# Patient Record
Sex: FEMALE | Race: BLACK OR AFRICAN AMERICAN | NOT HISPANIC OR LATINO | Employment: FULL TIME | URBAN - METROPOLITAN AREA
[De-identification: names, ages, dates, MRNs, and addresses within clinical notes are randomized per-mention and may not be internally consistent; named-entity substitution may affect disease eponyms.]

---

## 2023-05-16 ENCOUNTER — DOCUMENTATION (OUTPATIENT)
Dept: PRIMARY CARE | Facility: CLINIC | Age: 48
End: 2023-05-16

## 2023-05-16 ENCOUNTER — TELEPHONE (OUTPATIENT)
Dept: PRIMARY CARE | Facility: CLINIC | Age: 48
End: 2023-05-16

## 2023-05-16 NOTE — TELEPHONE ENCOUNTER
----- Message from Emi Lugo PA-C sent at 5/15/2023  9:26 PM EDT -----  Diagnostic left mammogram was benign.  Continue with annual screening

## 2024-11-22 ENCOUNTER — APPOINTMENT (OUTPATIENT)
Dept: OBSTETRICS AND GYNECOLOGY | Facility: CLINIC | Age: 49
End: 2024-11-22
Payer: COMMERCIAL

## 2025-01-28 ENCOUNTER — OFFICE VISIT (OUTPATIENT)
Dept: PRIMARY CARE | Facility: CLINIC | Age: 50
End: 2025-01-28
Payer: COMMERCIAL

## 2025-01-28 VITALS
HEART RATE: 101 BPM | BODY MASS INDEX: 23.39 KG/M2 | OXYGEN SATURATION: 96 % | DIASTOLIC BLOOD PRESSURE: 90 MMHG | SYSTOLIC BLOOD PRESSURE: 170 MMHG | HEIGHT: 64 IN | WEIGHT: 137 LBS | TEMPERATURE: 97.4 F

## 2025-01-28 DIAGNOSIS — E78.2 MIXED HYPERLIPIDEMIA: ICD-10-CM

## 2025-01-28 DIAGNOSIS — E11.42 DIABETIC PERIPHERAL NEUROPATHY (MULTI): ICD-10-CM

## 2025-01-28 DIAGNOSIS — E11.65 UNCONTROLLED TYPE 2 DIABETES MELLITUS WITH HYPERGLYCEMIA: ICD-10-CM

## 2025-01-28 DIAGNOSIS — E03.9 HYPOTHYROIDISM, UNSPECIFIED TYPE: ICD-10-CM

## 2025-01-28 DIAGNOSIS — I10 HYPERTENSION, ESSENTIAL: ICD-10-CM

## 2025-01-28 DIAGNOSIS — Z12.11 COLON CANCER SCREENING: ICD-10-CM

## 2025-01-28 DIAGNOSIS — Z00.00 ADULT GENERAL MEDICAL EXAM: Primary | ICD-10-CM

## 2025-01-28 DIAGNOSIS — Z12.31 BREAST CANCER SCREENING BY MAMMOGRAM: ICD-10-CM

## 2025-01-28 PROBLEM — E11.649 UNCONTROLLED TYPE 2 DIABETES MELLITUS WITH HYPOGLYCEMIA WITHOUT COMA: Status: ACTIVE | Noted: 2025-01-28

## 2025-01-28 LAB
POC FINGERSTICK BLOOD GLUCOSE: 534 MG/DL (ref 70–100)
POC HEMOGLOBIN A1C: 15 % (ref 4.2–6.5)

## 2025-01-28 PROCEDURE — 4010F ACE/ARB THERAPY RXD/TAKEN: CPT | Performed by: INTERNAL MEDICINE

## 2025-01-28 PROCEDURE — 83036 HEMOGLOBIN GLYCOSYLATED A1C: CPT | Performed by: INTERNAL MEDICINE

## 2025-01-28 PROCEDURE — 99396 PREV VISIT EST AGE 40-64: CPT | Performed by: INTERNAL MEDICINE

## 2025-01-28 PROCEDURE — 3080F DIAST BP >= 90 MM HG: CPT | Performed by: INTERNAL MEDICINE

## 2025-01-28 PROCEDURE — 1036F TOBACCO NON-USER: CPT | Performed by: INTERNAL MEDICINE

## 2025-01-28 PROCEDURE — 99396 PREV VISIT EST AGE 40-64: CPT | Mod: 25 | Performed by: INTERNAL MEDICINE

## 2025-01-28 PROCEDURE — 90750 HZV VACC RECOMBINANT IM: CPT | Performed by: INTERNAL MEDICINE

## 2025-01-28 PROCEDURE — 90715 TDAP VACCINE 7 YRS/> IM: CPT | Performed by: INTERNAL MEDICINE

## 2025-01-28 PROCEDURE — 3008F BODY MASS INDEX DOCD: CPT | Performed by: INTERNAL MEDICINE

## 2025-01-28 PROCEDURE — 3077F SYST BP >= 140 MM HG: CPT | Performed by: INTERNAL MEDICINE

## 2025-01-28 PROCEDURE — 82962 GLUCOSE BLOOD TEST: CPT | Performed by: INTERNAL MEDICINE

## 2025-01-28 RX ORDER — INSULIN GLARGINE 100 [IU]/ML
20 INJECTION, SOLUTION SUBCUTANEOUS EVERY 24 HOURS
Qty: 15 ML | Refills: 3 | Status: SHIPPED | OUTPATIENT
Start: 2025-01-28 | End: 2025-11-24

## 2025-01-28 RX ORDER — DULAGLUTIDE 0.75 MG/.5ML
0.75 INJECTION, SOLUTION SUBCUTANEOUS WEEKLY
Qty: 2 ML | Refills: 1 | Status: SHIPPED | OUTPATIENT
Start: 2025-01-28

## 2025-01-28 RX ORDER — INSULIN LISPRO 100 [IU]/ML
INJECTION, SOLUTION INTRAVENOUS; SUBCUTANEOUS
Qty: 15 ML | Refills: 3 | Status: SHIPPED | OUTPATIENT
Start: 2025-01-28

## 2025-01-28 RX ORDER — LANCETS 26 GAUGE
EACH MISCELLANEOUS
Qty: 1 EACH | Refills: 0 | Status: SHIPPED | OUTPATIENT
Start: 2025-01-28 | End: 2026-01-28

## 2025-01-28 RX ORDER — BLOOD-GLUCOSE METER
1 EACH MISCELLANEOUS 4 TIMES DAILY
Qty: 400 STRIP | Refills: 3 | Status: SHIPPED | OUTPATIENT
Start: 2025-01-28

## 2025-01-28 RX ORDER — GABAPENTIN 300 MG/1
300 CAPSULE ORAL NIGHTLY
Qty: 30 CAPSULE | Refills: 3 | Status: SHIPPED | OUTPATIENT
Start: 2025-01-28 | End: 2025-07-27

## 2025-01-28 RX ORDER — LISINOPRIL 20 MG/1
20 TABLET ORAL DAILY
Qty: 30 TABLET | Refills: 3 | Status: SHIPPED | OUTPATIENT
Start: 2025-01-28

## 2025-01-28 RX ORDER — ISOPROPYL ALCOHOL 70 ML/100ML
1 SWAB TOPICAL 4 TIMES DAILY
Qty: 400 EACH | Refills: 3 | Status: SHIPPED | OUTPATIENT
Start: 2025-01-28

## 2025-01-28 RX ORDER — DEXTROSE 4 G
TABLET,CHEWABLE ORAL
Qty: 1 EACH | Refills: 0 | Status: SHIPPED | OUTPATIENT
Start: 2025-01-28

## 2025-01-28 ASSESSMENT — PAIN SCALES - GENERAL: PAINLEVEL_OUTOF10: 0-NO PAIN

## 2025-01-28 NOTE — PROGRESS NOTES
Subjective   Patient ID: Milagros Forde is a 49 y.o. female who presents for Annual Exam.    HPI  1.  Physical exam.  Pt is presenting to establish with a new PCP. She has not been seen in years. She has DM and has not taken medications in years.  She is c/o dizziness, increased thirst and numbness/tingling in BLE.  She has DM retinopathy.  Pap: last done 1/2021  Mammogram: last done 5/2023  Colonoscopy: due     Review of Systems  All pertinent positive symptoms are included in the history of present illness.    All other systems have been reviewed and are negative and noncontributory to this patient's current ailments.    Past Medical History:   Diagnosis Date    Encounter for gynecological examination (general) (routine) without abnormal findings 09/17/2015    Well woman exam with routine gynecological exam    Encounter for other screening for malignant neoplasm of breast 09/17/2015    Breast cancer screening    Encounter for screening for infections with a predominantly sexual mode of transmission 09/17/2015    Screening for STD (sexually transmitted disease)    Encounter for screening for malignant neoplasm of cervix 09/17/2015    Cervical cancer screening    Hypothyroidism due to medicaments and other exogenous substances 10/07/2015    Hypothyroidism, iatrogenic    Other abnormal and inconclusive findings on diagnostic imaging of breast 09/25/2015    Abnormal mammogram of right breast    Other conditions influencing health status 09/29/2015    Type 2 diabetes mellitus, uncontrolled    Other specified disorders of teeth and supporting structures 11/09/2016    Pain, dental    Personal history of other endocrine, nutritional and metabolic disease 10/07/2015    History of type 2 diabetes mellitus    Personal history of other endocrine, nutritional and metabolic disease 09/29/2015    History of diabetic neuropathy    Personal history of other endocrine, nutritional and metabolic disease 10/07/2015    History of  "hypercholesterolemia    Vitamin D deficiency, unspecified 10/07/2015    Vitamin D deficiency     Past Surgical History:   Procedure Laterality Date    TUBAL LIGATION  09/29/2015    Tubal Ligation     Social History     Tobacco Use    Smoking status: Never    Smokeless tobacco: Never     Family History   Problem Relation Name Age of Onset    Hypertension Mother      Diabetes Mother      Other (Blindness) Mother          left eye     No Known Allergies  Immunization History   Administered Date(s) Administered    Tdap vaccine, age 7 year and older (BOOSTRIX, ADACEL) 01/28/2025    Zoster vaccine, recombinant, adult (SHINGRIX) 01/28/2025     Current Outpatient Medications   Medication Instructions    alcohol swabs (Alcohol Pads) pads, medicated 1 Pad, topical (top), 4 times daily    Autolet lancing device Use as instructed    blood sugar diagnostic (OneTouch Verio test strips) strip 1 strip, miscellaneous, 4 times daily    blood-glucose meter misc Use as directed    gabapentin (NEURONTIN) 300 mg, oral, Nightly    insulin glargine (LANTUS) 20 Units, subcutaneous, Every 24 hours, Take as directed per insulin instructions    insulin lispro (HumaLOG KwikPen Insulin) 100 unit/mL injection Take as directed per insulin instructions before meals and at bedtime 2 units for blood sugars 101-150 4 units 151-200  6 units 201-250  8 units 251-300 10 units 301-350  12 units 351-400  call MD for >400    lisinopril 20 mg, oral, Daily    Trulicity 0.75 mg, subcutaneous, Weekly     Objective   Visit Vitals  /90   Pulse 101   Temp 36.3 °C (97.4 °F)   Ht 1.619 m (5' 3.75\")   Wt 62.1 kg (137 lb)   SpO2 96%   BMI 23.70 kg/m²   Smoking Status Never   BSA 1.67 m²     Office Visit on 01/28/2025   Component Date Value    POC HEMOGLOBIN A1c 01/28/2025 15 (A)     POC Fingerstick Blood Gl* 01/28/2025 534 (A)      Physical Exam  CONSTITUTIONAL - well nourished, well developed, looks like stated age, in no acute distress, not ill-appearing, and " not tired appearing  SKIN - normal skin color and pigmentation, normal skin turgor without rash, lesions, or nodules visualized  HEAD - no trauma, normocephalic  EYES - pupils are equal and reactive to light, extraocular muscles are intact, and normal external exam  ENT - TM's intact, no injection, no signs of infection, uvula midline, normal tongue movement and throat normal, no exudate, nasal passage without discharge and patent  NECK - supple without rigidity, no neck mass was observed, no thyromegaly or thyroid nodules  CHEST - clear to auscultation, no wheezing, no crackles and no rales, good effort  CARDIAC - regular rate and regular rhythm, no skipped beats, no murmur  ABDOMEN - no organomegaly, soft, nontender, nondistended, normal bowel sounds, no guarding/rebound/rigidity, negative McBurney sign and negative Duggan sign  EXTREMITIES - no edema, no deformities  NEUROLOGICAL - normal gait, normal balance, normal motor, decreased sensation in BLE; no ataxia; alert, oriented and no focal signs  PSYCHIATRIC - alert, pleasant and cordial, age-appropriate  IMMUNOLOGIC - no cervical lymphadenopathy    Assessment/Plan   Problem List Items Addressed This Visit       Diabetic peripheral neuropathy (Multi)    Relevant Medications    gabapentin (Neurontin) 300 mg capsule    Other Relevant Orders    POCT glycosylated hemoglobin (Hb A1C) manually resulted (Completed)    Hypertension, essential    Relevant Medications    lisinopril 20 mg tablet    Uncontrolled type 2 diabetes mellitus with hyperglycemia    Relevant Medications    insulin glargine (Lantus) 100 unit/mL injection    insulin lispro (HumaLOG KwikPen Insulin) 100 unit/mL injection    dulaglutide (Trulicity) 0.75 mg/0.5 mL pen injector    blood-glucose meter misc    Autolet lancing device    blood sugar diagnostic (OneTouch Verio test strips) strip    alcohol swabs (Alcohol Pads) pads, medicated    Other Relevant Orders    POCT Fingerstick Glucose manually  resulted (Completed)    TSH with reflex to Free T4 if abnormal    Lipid Panel    Comprehensive Metabolic Panel    CBC    Referral to Clinical Pharmacy    Albumin-Creatinine Ratio, Urine Random    Urinalysis with Reflex Culture and Microscopic    Referral to Diabetes Education     Other Visit Diagnoses       Adult general medical exam    -  Primary    Breast cancer screening by mammogram        Relevant Orders    BI mammo bilateral screening tomosynthesis    Colon cancer screening        Relevant Orders    Colonoscopy Screening; Average Risk Patient

## 2025-01-29 ENCOUNTER — HOSPITAL ENCOUNTER (OUTPATIENT)
Dept: RADIOLOGY | Facility: CLINIC | Age: 50
Discharge: HOME | End: 2025-01-29
Payer: COMMERCIAL

## 2025-01-29 ENCOUNTER — TELEPHONE (OUTPATIENT)
Dept: PRIMARY CARE | Facility: CLINIC | Age: 50
End: 2025-01-29
Payer: COMMERCIAL

## 2025-01-29 VITALS — HEIGHT: 64 IN | WEIGHT: 137 LBS | BODY MASS INDEX: 23.39 KG/M2

## 2025-01-29 DIAGNOSIS — E11.65 UNCONTROLLED TYPE 2 DIABETES MELLITUS WITH HYPERGLYCEMIA: Primary | ICD-10-CM

## 2025-01-29 DIAGNOSIS — Z12.31 BREAST CANCER SCREENING BY MAMMOGRAM: ICD-10-CM

## 2025-01-29 PROBLEM — E11.649 UNCONTROLLED TYPE 2 DIABETES MELLITUS WITH HYPOGLYCEMIA WITHOUT COMA: Status: RESOLVED | Noted: 2025-01-28 | Resolved: 2025-01-29

## 2025-01-29 PROCEDURE — 77067 SCR MAMMO BI INCL CAD: CPT

## 2025-01-29 PROCEDURE — 77063 BREAST TOMOSYNTHESIS BI: CPT | Performed by: RADIOLOGY

## 2025-01-29 PROCEDURE — 77067 SCR MAMMO BI INCL CAD: CPT | Performed by: RADIOLOGY

## 2025-01-29 RX ORDER — PEN NEEDLE, DIABETIC 30 GX3/16"
1 NEEDLE, DISPOSABLE MISCELLANEOUS 4 TIMES DAILY
Qty: 400 EACH | Refills: 3 | Status: SHIPPED | OUTPATIENT
Start: 2025-01-29 | End: 2025-01-31 | Stop reason: WASHOUT

## 2025-01-29 NOTE — TELEPHONE ENCOUNTER
PT calling stating that RX for insulin was sent yesterday, but states she also needs the needles.    PT states that instructions on RX lispro state to contact PCP for dosage when blood sugars are over 400. PT states her sugars were over 500 at her appointment yesterday, and wanting to kow the correct dosage. Please contact PT at [phone]

## 2025-01-29 NOTE — TELEPHONE ENCOUNTER
I've never known insurance to pay for inpatient colonoscopy for a pt that is not admitted already in the hospital. She doesn't have any family or friends that could take her?  Order placed for needles. She can take 14 units of Humalog.

## 2025-01-29 NOTE — TELEPHONE ENCOUNTER
PT calling stating that she called to schedule her colonoscopy, but was told that she would need a  to transport after the procedure, and that she cannot use [employer] or insurance provided transportation. PT was told to contact PCP to ask if order can be placed for an In-Patient procedure for colonoscopy, as she does not have anyone that could drive her. Please advise

## 2025-01-30 ENCOUNTER — TELEPHONE (OUTPATIENT)
Dept: PRIMARY CARE | Facility: CLINIC | Age: 50
End: 2025-01-30
Payer: COMMERCIAL

## 2025-01-30 LAB
ALBUMIN SERPL-MCNC: 4.3 G/DL (ref 3.6–5.1)
ALBUMIN/CREAT UR: 229 MG/G CREAT
ALP SERPL-CCNC: 88 U/L (ref 37–153)
ALT SERPL-CCNC: 11 U/L (ref 6–29)
ANION GAP SERPL CALCULATED.4IONS-SCNC: 15 MMOL/L (CALC) (ref 7–17)
APPEARANCE UR: CLEAR
AST SERPL-CCNC: 12 U/L (ref 10–35)
BACTERIA #/AREA URNS HPF: ABNORMAL /HPF
BACTERIA UR CULT: ABNORMAL
BACTERIA UR CULT: NORMAL
BILIRUB SERPL-MCNC: 0.4 MG/DL (ref 0.2–1.2)
BILIRUB UR QL STRIP: NEGATIVE
BUN SERPL-MCNC: 20 MG/DL (ref 7–25)
CALCIUM SERPL-MCNC: 9.7 MG/DL (ref 8.6–10.4)
CHLORIDE SERPL-SCNC: 97 MMOL/L (ref 98–110)
CHOLEST SERPL-MCNC: 389 MG/DL
CHOLEST/HDLC SERPL: 4.8 (CALC)
CO2 SERPL-SCNC: 19 MMOL/L (ref 20–32)
COLOR UR: YELLOW
CREAT SERPL-MCNC: 0.75 MG/DL (ref 0.5–1.03)
CREAT UR-MCNC: 21 MG/DL (ref 20–275)
EGFRCR SERPLBLD CKD-EPI 2021: 97 ML/MIN/1.73M2
ERYTHROCYTE [DISTWIDTH] IN BLOOD BY AUTOMATED COUNT: 13.2 % (ref 11–15)
GLUCOSE SERPL-MCNC: 434 MG/DL (ref 65–139)
GLUCOSE UR QL STRIP: ABNORMAL
HCT VFR BLD AUTO: 40.9 % (ref 35–45)
HDLC SERPL-MCNC: 81 MG/DL
HGB BLD-MCNC: 13.2 G/DL (ref 11.7–15.5)
HGB UR QL STRIP: ABNORMAL
HYALINE CASTS #/AREA URNS LPF: ABNORMAL /LPF
KETONES UR QL STRIP: ABNORMAL
LDLC SERPL CALC-MCNC: 285 MG/DL (CALC)
LEUKOCYTE ESTERASE UR QL STRIP: NEGATIVE
MCH RBC QN AUTO: 29.9 PG (ref 27–33)
MCHC RBC AUTO-ENTMCNC: 32.3 G/DL (ref 32–36)
MCV RBC AUTO: 92.5 FL (ref 80–100)
MICROALBUMIN UR-MCNC: 4.8 MG/DL
NITRITE UR QL STRIP: NEGATIVE
NONHDLC SERPL-MCNC: 308 MG/DL (CALC)
PH UR STRIP: 5.5 [PH] (ref 5–8)
PLATELET # BLD AUTO: 335 THOUSAND/UL (ref 140–400)
PMV BLD REES-ECKER: 10.7 FL (ref 7.5–12.5)
POTASSIUM SERPL-SCNC: 4.7 MMOL/L (ref 3.5–5.3)
PROT SERPL-MCNC: 7.2 G/DL (ref 6.1–8.1)
PROT UR QL STRIP: NEGATIVE
RBC # BLD AUTO: 4.42 MILLION/UL (ref 3.8–5.1)
RBC #/AREA URNS HPF: ABNORMAL /HPF
SERVICE CMNT-IMP: ABNORMAL
SODIUM SERPL-SCNC: 131 MMOL/L (ref 135–146)
SP GR UR STRIP: 1.04 (ref 1–1.03)
SQUAMOUS #/AREA URNS HPF: ABNORMAL /HPF
T4 FREE SERPL-MCNC: 0.9 NG/DL (ref 0.8–1.8)
TRIGL SERPL-MCNC: 100 MG/DL
TSH SERPL-ACNC: 4.89 MIU/L
WBC # BLD AUTO: 6.5 THOUSAND/UL (ref 3.8–10.8)
WBC #/AREA URNS HPF: ABNORMAL /HPF

## 2025-01-30 RX ORDER — LEVOTHYROXINE SODIUM 25 UG/1
25 TABLET ORAL DAILY
Qty: 30 TABLET | Refills: 3 | Status: SHIPPED | OUTPATIENT
Start: 2025-01-30 | End: 2026-01-30

## 2025-01-30 RX ORDER — ROSUVASTATIN CALCIUM 10 MG/1
10 TABLET, COATED ORAL DAILY
Qty: 30 TABLET | Refills: 3 | Status: SHIPPED | OUTPATIENT
Start: 2025-01-30

## 2025-01-31 ENCOUNTER — APPOINTMENT (OUTPATIENT)
Dept: OBSTETRICS AND GYNECOLOGY | Facility: CLINIC | Age: 50
End: 2025-01-31
Payer: COMMERCIAL

## 2025-01-31 VITALS
WEIGHT: 137 LBS | BODY MASS INDEX: 24.27 KG/M2 | SYSTOLIC BLOOD PRESSURE: 100 MMHG | HEIGHT: 63 IN | DIASTOLIC BLOOD PRESSURE: 54 MMHG

## 2025-01-31 DIAGNOSIS — E11.65 UNCONTROLLED TYPE 2 DIABETES MELLITUS WITH HYPERGLYCEMIA: ICD-10-CM

## 2025-01-31 DIAGNOSIS — Z01.419 ENCOUNTER FOR GYNECOLOGICAL EXAMINATION (GENERAL) (ROUTINE) WITHOUT ABNORMAL FINDINGS: Primary | ICD-10-CM

## 2025-01-31 DIAGNOSIS — N95.2 ATROPHIC VAGINITIS: ICD-10-CM

## 2025-01-31 PROCEDURE — 4010F ACE/ARB THERAPY RXD/TAKEN: CPT | Performed by: STUDENT IN AN ORGANIZED HEALTH CARE EDUCATION/TRAINING PROGRAM

## 2025-01-31 PROCEDURE — 1036F TOBACCO NON-USER: CPT | Performed by: STUDENT IN AN ORGANIZED HEALTH CARE EDUCATION/TRAINING PROGRAM

## 2025-01-31 PROCEDURE — 99396 PREV VISIT EST AGE 40-64: CPT | Performed by: STUDENT IN AN ORGANIZED HEALTH CARE EDUCATION/TRAINING PROGRAM

## 2025-01-31 PROCEDURE — 3008F BODY MASS INDEX DOCD: CPT | Performed by: STUDENT IN AN ORGANIZED HEALTH CARE EDUCATION/TRAINING PROGRAM

## 2025-01-31 PROCEDURE — 3074F SYST BP LT 130 MM HG: CPT | Performed by: STUDENT IN AN ORGANIZED HEALTH CARE EDUCATION/TRAINING PROGRAM

## 2025-01-31 PROCEDURE — 3078F DIAST BP <80 MM HG: CPT | Performed by: STUDENT IN AN ORGANIZED HEALTH CARE EDUCATION/TRAINING PROGRAM

## 2025-01-31 RX ORDER — ESTRADIOL 0.1 MG/G
CREAM VAGINAL
Qty: 34 G | Refills: 3 | Status: SHIPPED | OUTPATIENT
Start: 2025-01-31

## 2025-01-31 RX ORDER — PEN NEEDLE, DIABETIC 30 GX3/16"
1 NEEDLE, DISPOSABLE MISCELLANEOUS 4 TIMES DAILY
Qty: 400 EACH | Refills: 3 | Status: SHIPPED | OUTPATIENT
Start: 2025-01-31

## 2025-01-31 SDOH — ECONOMIC STABILITY: TRANSPORTATION INSECURITY
IN THE PAST 12 MONTHS, HAS LACK OF TRANSPORTATION KEPT YOU FROM MEETINGS, WORK, OR FROM GETTING THINGS NEEDED FOR DAILY LIVING?: NO

## 2025-01-31 SDOH — ECONOMIC STABILITY: FOOD INSECURITY: WITHIN THE PAST 12 MONTHS, THE FOOD YOU BOUGHT JUST DIDN'T LAST AND YOU DIDN'T HAVE MONEY TO GET MORE.: SOMETIMES TRUE

## 2025-01-31 SDOH — ECONOMIC STABILITY: INCOME INSECURITY: IN THE LAST 12 MONTHS, WAS THERE A TIME WHEN YOU WERE NOT ABLE TO PAY THE MORTGAGE OR RENT ON TIME?: NO

## 2025-01-31 SDOH — ECONOMIC STABILITY: FOOD INSECURITY: WITHIN THE PAST 12 MONTHS, YOU WORRIED THAT YOUR FOOD WOULD RUN OUT BEFORE YOU GOT MONEY TO BUY MORE.: SOMETIMES TRUE

## 2025-01-31 SDOH — HEALTH STABILITY: PHYSICAL HEALTH: ON AVERAGE, HOW MANY DAYS PER WEEK DO YOU ENGAGE IN MODERATE TO STRENUOUS EXERCISE (LIKE A BRISK WALK)?: 7 DAYS

## 2025-01-31 SDOH — HEALTH STABILITY: PHYSICAL HEALTH: ON AVERAGE, HOW MANY MINUTES DO YOU ENGAGE IN EXERCISE AT THIS LEVEL?: 30 MIN

## 2025-01-31 SDOH — ECONOMIC STABILITY: TRANSPORTATION INSECURITY
IN THE PAST 12 MONTHS, HAS THE LACK OF TRANSPORTATION KEPT YOU FROM MEDICAL APPOINTMENTS OR FROM GETTING MEDICATIONS?: NO

## 2025-01-31 ASSESSMENT — PATIENT HEALTH QUESTIONNAIRE - PHQ9
10. IF YOU CHECKED OFF ANY PROBLEMS, HOW DIFFICULT HAVE THESE PROBLEMS MADE IT FOR YOU TO DO YOUR WORK, TAKE CARE OF THINGS AT HOME, OR GET ALONG WITH OTHER PEOPLE: NOT DIFFICULT AT ALL
SUM OF ALL RESPONSES TO PHQ9 QUESTIONS 1 AND 2: 1
2. FEELING DOWN, DEPRESSED OR HOPELESS: NOT AT ALL
1. LITTLE INTEREST OR PLEASURE IN DOING THINGS: SEVERAL DAYS

## 2025-01-31 ASSESSMENT — PAIN SCALES - GENERAL: PAINLEVEL_OUTOF10: 1

## 2025-01-31 ASSESSMENT — SOCIAL DETERMINANTS OF HEALTH (SDOH)
WITHIN THE LAST YEAR, HAVE YOU BEEN KICKED, HIT, SLAPPED, OR OTHERWISE PHYSICALLY HURT BY YOUR PARTNER OR EX-PARTNER?: NO
HOW HARD IS IT FOR YOU TO PAY FOR THE VERY BASICS LIKE FOOD, HOUSING, MEDICAL CARE, AND HEATING?: SOMEWHAT HARD
WITHIN THE LAST YEAR, HAVE TO BEEN RAPED OR FORCED TO HAVE ANY KIND OF SEXUAL ACTIVITY BY YOUR PARTNER OR EX-PARTNER?: NO
WITHIN THE LAST YEAR, HAVE YOU BEEN AFRAID OF YOUR PARTNER OR EX-PARTNER?: NO
WITHIN THE LAST YEAR, HAVE YOU BEEN HUMILIATED OR EMOTIONALLY ABUSED IN OTHER WAYS BY YOUR PARTNER OR EX-PARTNER?: NO

## 2025-01-31 ASSESSMENT — LIFESTYLE VARIABLES
HOW MANY STANDARD DRINKS CONTAINING ALCOHOL DO YOU HAVE ON A TYPICAL DAY: PATIENT DOES NOT DRINK
SKIP TO QUESTIONS 9-10: 1
AUDIT-C TOTAL SCORE: 0
HOW OFTEN DO YOU HAVE A DRINK CONTAINING ALCOHOL: NEVER
HOW OFTEN DO YOU HAVE SIX OR MORE DRINKS ON ONE OCCASION: NEVER

## 2025-01-31 NOTE — PROGRESS NOTES
Subjective   Milagros Forde is a 49 y.o. y.o. here for a routine exam. Current concerns: external itching.      Gynecologic History  No LMP recorded. Patient is postmenopausal. No menses since age 44  Contraception: post menopausal status  Last Pap: 2021. Results were: negative cotest  Last mammogram: 24. Results were: BI-RADS 1 with follow up including 1 year repeat recommended.  Last colon cancer screen: is scheduled    Obstetric History  OB History    Para Term  AB Living   3 3 3         SAB IAB Ectopic Multiple Live Births                  # Outcome Date GA Lbr Antonio/2nd Weight Sex Type Anes PTL Lv   3 Term            2 Term            1 Term                Objective   Physical Exam  Vitals and nursing note reviewed.   Constitutional:       Appearance: Normal appearance.   HENT:      Head: Normocephalic and atraumatic.      Nose: Nose normal.      Mouth/Throat:      Mouth: Mucous membranes are moist.   Eyes:      Extraocular Movements: Extraocular movements intact.      Conjunctiva/sclera: Conjunctivae normal.   Pulmonary:      Effort: Pulmonary effort is normal.   Chest:      Chest wall: No deformity or swelling.   Breasts:     Breasts are symmetrical.      Right: Normal.      Left: Normal.   Abdominal:      General: There is no distension.      Palpations: Abdomen is soft. There is no mass.      Tenderness: There is no abdominal tenderness.   Genitourinary:     General: Normal vulva.      Vagina: Normal.      Cervix: Normal.      Uterus: Normal.       Adnexa: Right adnexa normal and left adnexa normal.      Rectum: Normal.      Comments: Atrophic changes of external genitalia and vaginal mucosa  Musculoskeletal:      Cervical back: Normal range of motion.   Skin:     General: Skin is warm and dry.   Neurological:      General: No focal deficit present.      Mental Status: She is alert.   Psychiatric:         Mood and Affect: Mood normal.         Behavior: Behavior normal.         Thought  Content: Thought content normal.         Judgment: Judgment normal.         Assessment/Plan   Unremarkable gynecologic exam with exception of atrophy  Estrogen cream ordered for atrophic vaginitis  Insulin pen needles refilled per her request.  Follow up for well care or as needed.    Renita Banda MD

## 2025-02-07 ENCOUNTER — APPOINTMENT (OUTPATIENT)
Dept: PHARMACY | Facility: HOSPITAL | Age: 50
End: 2025-02-07
Payer: COMMERCIAL

## 2025-02-07 DIAGNOSIS — E11.65 UNCONTROLLED TYPE 2 DIABETES MELLITUS WITH HYPERGLYCEMIA: ICD-10-CM

## 2025-02-07 NOTE — PROGRESS NOTES
Clinical Pharmacy Appointment    Patient ID: Milagros Forde is a 50 y.o. female who presents for Diabetes.    Pt is here for First appointment.     Referring Provider: Maldonado Cole MD  PCP: Maldonado Cole MD   Last visit with PCP: 1/28/25   Next visit with PCP: 2/11/25    Subjective     Interval History  Recently restarted on DM medications    HPI  DIABETES MELLITUS TYPE II:    Diagnosed with diabetes: diagnosed around age 28. Known diabetic complications: Retinopathy and neuropathy.  Does patient follow with Endocrinology: To establish next month.  Last optometry exam: some vision changes - advised to schedule with ophthalmology  Most recent visit in Podiatry: Not discussed -- patient denies sores or cuts on feet today      Current diabetic medications include:  Trulicity 0.75 mg once weekly  Humalog 14 units with sliding scale   Lantus 20 units at bedtime    Clarifications to above regimen: started Trulicity and insulins 2 weeks ago.  Adverse Effects: Low BG    Past diabetic medications include:  Metformin (smell and difficulty taking large tablet)    Glucose Readings:  Glucometer/CGM Type: One Touch Verio  Patient tests BG 3 times per day - morning, meals, and bedtime.    Current home BG readings (mg/dL): 167 mg/dL, 129 mg/dL yesterday   Previous home BG readings (mg/dL): 300-400 mg/dL    Any episodes of hypoglycemia? Yes, low around 53 mg/dL yesterday. Falls low about 2x per week . Did patient treat episode of hypoglycemia appropriately? Yes, has serving of carbohydrates and skipped insulin   Does the patient have a prescription for ready-to-use Glucagon? N/a    Lifestyle:  Diet: 3 meals/day. Significant reduction in appetite on Trulicity. Reducing carbohydrate and salt intake.   Physical Activity: Not discussed  Tobacco history: None     Secondary Prevention:  Statin? Yes - Rosuvastatin 10 mg daily   ACE-I/ARB? Yes - Lisinopril 10 mg daily (notes dry cough)  Aspirin? No    Pertinent PMH Review:  [facility]  of Pancreatitis: No  PMH of Retinopathy: Yes  PMH of Urinary Tract Infections: No  PMH of MTC: No  PMH of ASCVD: No   PMH of CKD: No   PMH of CHF: No   PMH of Obesity: No   UACR/EGFR in last year?: Yes  ALBUMIN/CREATININE RATIO, RANDOM URINE   Date Value Ref Range Status   01/28/2025 229 (H) <30 mg/g creat Final     Comment:        The ADA defines abnormalities in albumin  excretion as follows:     Albuminuria Category        Result (mg/g creatinine)     Normal to Mildly increased   <30  Moderately increased            Severely increased           > OR = 300     The ADA recommends that at least two of three  specimens collected within a 3-6 month period be  abnormal before considering a patient to be  within a diagnostic category.       Albumin/Creatine Ratio   Date Value Ref Range Status   11/23/2020 41.0 (H) 0.0 - 30.0 ug/mg crt Final     Immunizations:  Influenza? No  COVID? No  Pneumonia? No  Shingles? Yes  Hepatitis B? No    Medication Reconciliation:  No changes     Drug Interactions  No relevant drug interactions were noted.    Medication System Management  Patient's preferred pharmacy: [facility]   Adherence/Organization: No issues   Affordability/Accessibility: No issues     Objective   No Known Allergies  Social History     Social History Narrative    Not on file      Medication Review  Current Outpatient Medications   Medication Instructions    alcohol swabs (Alcohol Pads) pads, medicated 1 Pad, topical (top), 4 times daily    Autolet lancing device Use as instructed    blood sugar diagnostic (OneTouch Verio test strips) strip 1 strip, miscellaneous, 4 times daily    blood-glucose meter misc Use as directed    estradiol (Estrace) 0.01 % (0.1 mg/gram) vaginal cream Apply a pea-sized amount to the entrance of the vagina bedtime for 2 weeks, then at bedtime twice a week.    gabapentin (NEURONTIN) 300 mg, oral, Nightly    insulin glargine (LANTUS) 20 Units, subcutaneous, Every 24 hours, Take as directed  "per insulin instructions    insulin lispro (HumaLOG KwikPen Insulin) 100 unit/mL injection Take as directed per insulin instructions before meals and at bedtime 2 units for blood sugars 101-150 4 units 151-200  6 units 201-250  8 units 251-300 10 units 301-350  12 units 351-400  call MD for >400    levothyroxine (SYNTHROID, LEVOXYL) 25 mcg, oral, Daily, Take on an empty stomach at the same time each day, either 30 to 60 minutes prior to breakfast    lisinopril 20 mg, oral, Daily    pen needle, diabetic (BD Ultra-Fine Mini Pen Needle) 31 gauge x 3/16\" needle 1 each, miscellaneous, 4 times daily    rosuvastatin (CRESTOR) 10 mg, oral, Daily    Trulicity 0.75 mg, subcutaneous, Weekly      Vitals  BP Readings from Last 2 Encounters:   01/31/25 100/54   01/28/25 170/90     BMI Readings from Last 1 Encounters:   01/31/25 24.27 kg/m²      Labs  A1C  Lab Results   Component Value Date    HGBA1C 15 (A) 01/28/2025    HGBA1C 13.2 (A) 08/08/2022    HGBA1C 12.0 11/23/2020     BMP  Lab Results   Component Value Date    CALCIUM 9.7 01/29/2025     (L) 01/29/2025    K 4.7 01/29/2025    CO2 19 (L) 01/29/2025    CL 97 (L) 01/29/2025    BUN 20 01/29/2025    CREATININE 0.75 01/29/2025    EGFR 97 01/29/2025     LFTs  Lab Results   Component Value Date    ALT 11 01/29/2025    AST 12 01/29/2025    ALKPHOS 88 01/29/2025    BILITOT 0.4 01/29/2025     FLP  Lab Results   Component Value Date    TRIG 100 01/29/2025    CHOL 389 (H) 01/29/2025    LDLF 257 (H) 08/08/2022    LDLCALC 285 (H) 01/29/2025    HDL 81 01/29/2025     Urine Microalbumin  Lab Results   Component Value Date    MICROALBCREA 229 (H) 01/28/2025     Weight Management  Wt Readings from Last 3 Encounters:   01/31/25 62.1 kg (137 lb)   01/29/25 62.1 kg (137 lb)   01/28/25 62.1 kg (137 lb)      There is no height or weight on file to calculate BMI.     Assessment/Plan   Problem List Items Addressed This Visit       Uncontrolled type 2 diabetes mellitus with hyperglycemia "     Patient's goal A1c is < 7.0%.  Is pt at goal? most recent A1c on 1/28/25 of 15.0%. Prior A1c was 13.2% on 8/8/22.      Rationale for plan: Patient's SMBGs are variable throughout the day. Reports that she is checking BG as directed 5 times daily via finger stick. Discussed CGM and patient is not interested at this time. Current DM regimen includes Trulicity 0.75 mg once weekly, Humalog mild sliding scale with meals and at bedtime, and Lantus 20 units at bedtime. Patient reports that she lost her directions for Humalog SS and started injecting 14 units with meals although had to skip on several occasions due to low readings. Reiterated correct SSI instructions and sent to patient via email. Also discussed BG goals and management of low BG readings. Writer suspects that low readings are due to higher Humalog doses. Advised patient to follow SSI for the next few days and bring new readings to PCP visit next week. Also discussed that Trulicity dose can be increased after 4 injections. Patient endorses substantial reduction in appetite and does not want to lose too much weight. Will continue to monitor. At this time, advised patient to continue glucose monitoring and follow insulin dosing instructions provided. Plan for follow up in one week to assess readings and need for dose adjustments.     Medication Changes:  CONTINUE  Trulicity 0.75 mg once weekly  Humalog mild sliding scale with meals and at bedtime  2 units for blood sugars 101-150  4 units for blood sugars 151-200  6 units for blood sugars 201-250  8 units for blood sugars 251-300  10 units for blood sugars 301-350  12 units for blood sugars 351-400   call MD for >400  Lantus 20 units at bedtime    Future Considerations:  Titration of GLP1  Dose adjust insulin if needed     Monitoring and Education:  Hypoglycemia occurs when your blood sugars fall below 70 mg/dL.  Symptoms may include feeling weak, shaky, sweaty, irritable, or hungry.   If these symptoms  occur it is best to check your blood sugar.   To treat hypoglycemia, it is recommended to consume 15 grams of carbohydrates and recheck your sugar in 15 minutes. If your sugars remain below 70 mg/dL, then you should have another serving of 15 grams of carbohydrates. Once your sugar is greater than 70 mg/dL, you should eat a meal to prevent your sugars from falling.   15 grams of carbohydrates may include glucose tablets, glucose gel, 4 ounces of soda or juice, 1 tablespoon of honey or sugar, or hard candies.     Clinical Pharmacist follow-up: 2/17/25 @ 1000, Telehealth visit    Continue all meds under the continuation of care with the referring provider and clinical pharmacy team.    Thank you,  Marva Mccoy, PharmD  Clinical Pharmacist  [phone]    Verbal consent to manage patient's drug therapy was obtained from the patient. They were informed they may decline to participate or withdraw from participation in pharmacy services at any time.

## 2025-02-10 ENCOUNTER — APPOINTMENT (OUTPATIENT)
Dept: ENDOCRINOLOGY | Facility: CLINIC | Age: 50
End: 2025-02-10
Payer: COMMERCIAL

## 2025-02-11 ENCOUNTER — OFFICE VISIT (OUTPATIENT)
Dept: PRIMARY CARE | Facility: CLINIC | Age: 50
End: 2025-02-11
Payer: COMMERCIAL

## 2025-02-11 VITALS
DIASTOLIC BLOOD PRESSURE: 84 MMHG | TEMPERATURE: 97 F | WEIGHT: 136 LBS | OXYGEN SATURATION: 100 % | SYSTOLIC BLOOD PRESSURE: 134 MMHG | HEART RATE: 89 BPM | BODY MASS INDEX: 24.09 KG/M2

## 2025-02-11 DIAGNOSIS — E11.65 UNCONTROLLED TYPE 2 DIABETES MELLITUS WITH HYPERGLYCEMIA: Primary | ICD-10-CM

## 2025-02-11 DIAGNOSIS — I10 HYPERTENSION, ESSENTIAL: ICD-10-CM

## 2025-02-11 PROCEDURE — 99213 OFFICE O/P EST LOW 20 MIN: CPT | Performed by: INTERNAL MEDICINE

## 2025-02-11 PROCEDURE — 3075F SYST BP GE 130 - 139MM HG: CPT | Performed by: INTERNAL MEDICINE

## 2025-02-11 PROCEDURE — 1036F TOBACCO NON-USER: CPT | Performed by: INTERNAL MEDICINE

## 2025-02-11 PROCEDURE — 3079F DIAST BP 80-89 MM HG: CPT | Performed by: INTERNAL MEDICINE

## 2025-02-11 PROCEDURE — 4010F ACE/ARB THERAPY RXD/TAKEN: CPT | Performed by: INTERNAL MEDICINE

## 2025-02-11 RX ORDER — LANCETS 33 GAUGE
1 EACH MISCELLANEOUS 4 TIMES DAILY
Qty: 400 EACH | Refills: 3 | Status: SHIPPED | OUTPATIENT
Start: 2025-02-11 | End: 2025-02-11

## 2025-02-11 RX ORDER — BLOOD SUGAR DIAGNOSTIC
1 STRIP MISCELLANEOUS 4 TIMES DAILY
COMMUNITY

## 2025-02-11 RX ORDER — LANCETS 33 GAUGE
1 EACH MISCELLANEOUS 4 TIMES DAILY
Qty: 400 EACH | Refills: 3 | Status: SHIPPED | OUTPATIENT
Start: 2025-02-11

## 2025-02-11 RX ORDER — DEXTROSE 4 G
1 TABLET,CHEWABLE ORAL DAILY
COMMUNITY

## 2025-02-11 RX ORDER — ACETAMINOPHEN 500 MG
1 TABLET ORAL DAILY
Qty: 1 KIT | Refills: 0 | Status: SHIPPED | OUTPATIENT
Start: 2025-02-11

## 2025-02-11 RX ORDER — LANCETS 33 GAUGE
1 EACH MISCELLANEOUS 4 TIMES DAILY
COMMUNITY
End: 2025-02-11 | Stop reason: SDUPTHER

## 2025-02-11 ASSESSMENT — PAIN SCALES - GENERAL: PAINLEVEL_OUTOF10: 0-NO PAIN

## 2025-02-11 ASSESSMENT — ENCOUNTER SYMPTOMS
DIZZINESS: 0
FEVER: 0
POLYPHAGIA: 0
CHILLS: 0
SHORTNESS OF BREATH: 0
HEADACHES: 0
POLYDIPSIA: 0
PALPITATIONS: 0

## 2025-02-11 ASSESSMENT — PATIENT HEALTH QUESTIONNAIRE - PHQ9
1. LITTLE INTEREST OR PLEASURE IN DOING THINGS: NOT AT ALL
2. FEELING DOWN, DEPRESSED OR HOPELESS: NOT AT ALL
SUM OF ALL RESPONSES TO PHQ9 QUESTIONS 1 AND 2: 0

## 2025-02-11 NOTE — PROGRESS NOTES
Subjective   Patient ID: Milagros Forde is a 50 y.o. female who presents for Blood Pressure Check.    This is a 50 y.o. presenting for follow up DM and HTN.   Pt states that she has been taking Trulicity, Lantus, and short acting insulin. She lost her sliding scale insulin directions and took 10 units of Humalog for a blood sugar of 160 and had a hypoglycemic episode of 40.   Pt's BP has been better controlled.         Review of Systems   Constitutional:  Negative for chills and fever.   Respiratory:  Negative for shortness of breath.    Cardiovascular:  Negative for chest pain and palpitations.   Endocrine: Negative for polydipsia, polyphagia and polyuria.   Neurological:  Negative for dizziness and headaches.       Objective   /84   Pulse 89   Temp 36.1 °C (97 °F)   Wt 61.7 kg (136 lb)   SpO2 100%   BMI 24.09 kg/m²     Physical Exam  Vitals reviewed.   Constitutional:       General: She is not in acute distress.     Appearance: Normal appearance. She is normal weight. She is not ill-appearing.   Cardiovascular:      Rate and Rhythm: Normal rate and regular rhythm.      Heart sounds: Normal heart sounds.   Pulmonary:      Effort: Pulmonary effort is normal.      Breath sounds: Normal breath sounds.   Neurological:      General: No focal deficit present.      Mental Status: She is alert and oriented to person, place, and time.   Psychiatric:         Mood and Affect: Mood normal.         Assessment/Plan   Diagnoses and all orders for this visit:  Uncontrolled type 2 diabetes mellitus with hyperglycemia  Comments:  Continue current meds. Follow sliding scale instructions  Orders:  -     lancets 33 gauge misc; 1 Lancet 4 times a day.  Hypertension, essential  Comments:  Improved  Orders:  -     blood pressure monitor kit; 1 each once daily.  Other orders  -     Follow Up In Primary Care - Established  -     Follow Up In Primary Care - Established; Future

## 2025-02-17 ENCOUNTER — APPOINTMENT (OUTPATIENT)
Dept: PHARMACY | Facility: HOSPITAL | Age: 50
End: 2025-02-17
Payer: COMMERCIAL

## 2025-02-17 DIAGNOSIS — E11.65 UNCONTROLLED TYPE 2 DIABETES MELLITUS WITH HYPERGLYCEMIA: ICD-10-CM

## 2025-02-17 NOTE — PROGRESS NOTES
Clinical Pharmacy Appointment    Patient ID: Milagros Forde is a 50 y.o. female who presents for Diabetes.    Pt is here for Follow Up appointment.     Referring Provider: Maldonado Cole MD  PCP: Maldonado Cole MD   Last visit with PCP: 1/28/25   Next visit with PCP: 2/11/25    Subjective     Interval History  Recently restarted on DM medications - tolerating well and BG levels stabilizing    HPI  DIABETES MELLITUS TYPE II:    Diagnosed with diabetes: diagnosed around age 28. Known diabetic complications: Retinopathy and neuropathy.  Does patient follow with Endocrinology: To establish next month.  Last optometry exam: some vision changes - advised to schedule with ophthalmology  Most recent visit in Podiatry: Not discussed -- patient denies sores or cuts on feet today      Current diabetic medications include:  Trulicity 0.75 mg once weekly  Humalog Kwikpen SSI  2 units for blood sugars 101-150  4 units for blood sugars 151-200  6 units for blood sugars 201-250  8 units for blood sugars 251-300  10 units for blood sugars 301-350  12 units for blood sugars 351-400   call MD for >400  Lantus 20 units at bedtime    Clarifications to above regimen: started Trulicity and insulins a little over 3 weeks ago.   Adverse Effects: Some fatigue and headaches. Coughing reported as primarily dry and triggered by a tickle/itch in throat. Drinking water does not help, cough drops help some. May be related to lisinopril.     Past diabetic medications include:  Metformin (disliked the smell and had difficulty taking large tablet)    Glucose Readings:  Glucometer/CGM Type: One Touch Verio  Patient tests BG 3 times per day - morning, meals, and bedtime.  Not interested in CGM at this time.     Current home BG readings (mg/dL): highest readings 156-160 mg/dL, lowest reading around 90 mg/dL.  Previous home BG readings (mg/dL): 300-400 mg/dL    Any episodes of hypoglycemia? No, no lows reported . Did patient treat episode of  hypoglycemia appropriately? Yes, has serving of carbohydrates and skipped insulin   Does the patient have a prescription for ready-to-use Glucagon? N/a    Lifestyle:  Diet: 3 meals/day. Significant reduction in appetite on Trulicity. Reducing carbohydrate and salt intake. Uses salt substitutes at home. Breakfast, 3 half pieces of pepe on half sandwich with yogurt (1 carb) and coffee with cream. Lunch, maybe lunch meat with low sodium (struggles with low sodium options for lunch). Dinner, chicken with noodles, rice, or 1 potato with vegetables. Cut out ramen noodles.  Physical Activity: takes public transportation and walks a lot, busy lifestyle, no structured exercise.   Tobacco history: None     Secondary Prevention:  Statin? Yes - Rosuvastatin 10 mg daily   ACE-I/ARB? Yes - Lisinopril 10 mg daily (notes dry cough)  Aspirin? No    Pertinent PMH Review:  PMH of Pancreatitis: No  PMH of Retinopathy: Yes  PMH of Urinary Tract Infections: No  PMH of MTC: No  PMH of ASCVD: No   PMH of CKD: No   PMH of CHF: No   PMH of Obesity: No   UACR/EGFR in last year?: Yes  ALBUMIN/CREATININE RATIO, RANDOM URINE   Date Value Ref Range Status   01/28/2025 229 (H) <30 mg/g creat Final     Comment:        The ADA defines abnormalities in albumin  excretion as follows:     Albuminuria Category        Result (mg/g creatinine)     Normal to Mildly increased   <30  Moderately increased            Severely increased           > OR = 300     The ADA recommends that at least two of three  specimens collected within a 3-6 month period be  abnormal before considering a patient to be  within a diagnostic category.       Albumin/Creatine Ratio   Date Value Ref Range Status   11/23/2020 41.0 (H) 0.0 - 30.0 ug/mg crt Final     Immunizations:  Influenza? No  COVID? No  Pneumonia? No  Shingles? Yes  Hepatitis B? No    Medication Reconciliation:  No changes     Drug Interactions  No relevant drug interactions were noted.    Medication System  "Management  Patient's preferred pharmacy: [facility]   Adherence/Organization: No issues   Affordability/Accessibility: No issues     Objective   No Known Allergies  Social History     Social History Narrative    Not on file      Medication Review  Current Outpatient Medications   Medication Instructions    blood pressure monitor kit 1 each, miscellaneous, Daily    blood-glucose meter (OneTouch Ultra2 Meter) misc 1 each, miscellaneous, Daily    estradiol (Estrace) 0.01 % (0.1 mg/gram) vaginal cream Apply a pea-sized amount to the entrance of the vagina bedtime for 2 weeks, then at bedtime twice a week.    gabapentin (NEURONTIN) 300 mg, oral, Nightly    insulin glargine (LANTUS) 20 Units, subcutaneous, Every 24 hours, Take as directed per insulin instructions    insulin lispro (HumaLOG KwikPen Insulin) 100 unit/mL injection Take as directed per insulin instructions before meals and at bedtime 2 units for blood sugars 101-150 4 units 151-200  6 units 201-250  8 units 251-300 10 units 301-350  12 units 351-400  call MD for >400    lancets 33 gauge misc 1 Lancet, miscellaneous, 4 times daily    levothyroxine (SYNTHROID, LEVOXYL) 25 mcg, oral, Daily, Take on an empty stomach at the same time each day, either 30 to 60 minutes prior to breakfast    lisinopril 20 mg, oral, Daily    OneTouch Ultra Test strip 1 strip, miscellaneous, 4 times daily, Before meals and at bedtime    pen needle, diabetic (BD Ultra-Fine Mini Pen Needle) 31 gauge x 3/16\" needle 1 each, miscellaneous, 4 times daily    rosuvastatin (CRESTOR) 10 mg, oral, Daily    Trulicity 0.75 mg, subcutaneous, Weekly      Vitals  BP Readings from Last 2 Encounters:   02/11/25 134/84   01/31/25 100/54     BMI Readings from Last 1 Encounters:   02/11/25 24.09 kg/m²      Labs  A1C  Lab Results   Component Value Date    HGBA1C 15 (A) 01/28/2025    HGBA1C 13.2 (A) 08/08/2022    HGBA1C 12.0 11/23/2020     BMP  Lab Results   Component Value Date    CALCIUM 9.7 01/29/2025    "  (L) 01/29/2025    K 4.7 01/29/2025    CO2 19 (L) 01/29/2025    CL 97 (L) 01/29/2025    BUN 20 01/29/2025    CREATININE 0.75 01/29/2025    EGFR 97 01/29/2025     LFTs  Lab Results   Component Value Date    ALT 11 01/29/2025    AST 12 01/29/2025    ALKPHOS 88 01/29/2025    BILITOT 0.4 01/29/2025     FLP  Lab Results   Component Value Date    TRIG 100 01/29/2025    CHOL 389 (H) 01/29/2025    LDLF 257 (H) 08/08/2022    LDLCALC 285 (H) 01/29/2025    HDL 81 01/29/2025     Urine Microalbumin  Lab Results   Component Value Date    MICROALBCREA 229 (H) 01/28/2025     Weight Management  Wt Readings from Last 3 Encounters:   02/11/25 61.7 kg (136 lb)   01/31/25 62.1 kg (137 lb)   01/29/25 62.1 kg (137 lb)      There is no height or weight on file to calculate BMI.     Assessment/Plan   Problem List Items Addressed This Visit       Uncontrolled type 2 diabetes mellitus with hyperglycemia       Patient's goal A1c is < 7.0%.  Is pt at goal? No, most recent A1c on 1/28/25 of 15.0%. Prior A1c was 13.2% on 8/8/22. Next A1c due after 4/28/25.    Rationale for plan: Patient's SMBGs are improved from previous readings. Patient reports BG levels between  mg/dL. Endorses resolution of low BG events since adjusting sliding scale. Patient continues to check BG as directed 5 times daily via finger stick. Discussed CGM and patient is not interested at this time. Current DM regimen includes Trulicity 0.75 mg once weekly on Wednesdays, Humalog mild sliding scale with meals and at bedtime, and Lantus 20 units at bedtime. Patient reports that she is tolerating current regimen well and BG is improved. Reviewed BG goals and management of low BG readings. Also discussed diet at length as the patient has made several changes to limit both carbohydrate and sodium intake. Patient endorses continued reduction in appetite also less noticeable than before. Patient has normal BMI and does not want to lose too much weight. Will continue to  monitor. At this time, advised patient to continue glucose monitoring and continue current medication regimen. Plan for follow up in 2 weeks to review BG readings and determine if dose increase in Trulicity is appropriate.     Medication Changes:  CONTINUE:   Humalog mild sliding scale with meals and at bedtime  2 units for blood sugars 101-150  4 units for blood sugars 151-200  6 units for blood sugars 201-250  8 units for blood sugars 251-300  10 units for blood sugars 301-350  12 units for blood sugars 351-400   call MD for >400  Lantus 20 units at bedtime  Trulicity 0.75 mg once weekly on Wednesdays    Future Considerations:  Titration of GLP1  Dose adjust insulin if needed     Monitoring and Education:  Hypoglycemia occurs when your blood sugars fall below 70 mg/dL.  Symptoms may include feeling weak, shaky, sweaty, irritable, or hungry.   If these symptoms occur it is best to check your blood sugar.   To treat hypoglycemia, it is recommended to consume 15 grams of carbohydrates and recheck your sugar in 15 minutes. If your sugars remain below 70 mg/dL, then you should have another serving of 15 grams of carbohydrates. Once your sugar is greater than 70 mg/dL, you should eat a meal to prevent your sugars from falling.   15 grams of carbohydrates may include glucose tablets, glucose gel, 4 ounces of soda or juice, 1 tablespoon of honey or sugar, or hard candies.     Clinical Pharmacist follow-up: 3/3/25 @ 1330, Telehealth visit    Continue all meds under the continuation of care with the referring provider and clinical pharmacy team.    Thank you,  Marva Mccoy, PharmD  Clinical Pharmacist  [phone]    Verbal consent to manage patient's drug therapy was obtained from the patient. They were informed they may decline to participate or withdraw from participation in pharmacy services at any time.

## 2025-03-03 ENCOUNTER — APPOINTMENT (OUTPATIENT)
Dept: PHARMACY | Facility: HOSPITAL | Age: 50
End: 2025-03-03
Payer: COMMERCIAL

## 2025-03-03 DIAGNOSIS — E11.65 UNCONTROLLED TYPE 2 DIABETES MELLITUS WITH HYPERGLYCEMIA: Primary | ICD-10-CM

## 2025-03-03 DIAGNOSIS — I10 HYPERTENSION, ESSENTIAL: Primary | ICD-10-CM

## 2025-03-03 DIAGNOSIS — E11.42 DIABETIC PERIPHERAL NEUROPATHY (MULTI): ICD-10-CM

## 2025-03-03 RX ORDER — OLMESARTAN MEDOXOMIL 20 MG/1
20 TABLET ORAL DAILY
Qty: 30 TABLET | Refills: 5 | Status: SHIPPED | OUTPATIENT
Start: 2025-03-03 | End: 2025-08-30

## 2025-03-03 RX ORDER — GABAPENTIN 300 MG/1
300 CAPSULE ORAL NIGHTLY
Qty: 30 CAPSULE | Refills: 3 | Status: SHIPPED | OUTPATIENT
Start: 2025-03-03 | End: 2025-08-30

## 2025-03-03 NOTE — TELEPHONE ENCOUNTER
She states she has a cough that has gotten worse since starting lisinopril - she also is coughing up mucus now which is like bronchitis. She is wondering if this is from the rx.

## 2025-03-03 NOTE — TELEPHONE ENCOUNTER
CALL PLACED TO PT. PT MADE AWARE.  PT STATING SHE HAS ALSO STARTED TO HAVE BODY ACHES, AND SAID STATING SHE IS NOT SURE IF THIS IS COMING FROM THE COUGH OR IF SHE IS COMING DOWN WITH SOMETHING OR IF THIS IS COMING FROM THE MEDICATION PT ALSO REQUESTING A REFILL. LAST OV 2/11/25

## 2025-03-07 ENCOUNTER — APPOINTMENT (OUTPATIENT)
Age: 50
End: 2025-03-07
Payer: COMMERCIAL

## 2025-03-10 ENCOUNTER — APPOINTMENT (OUTPATIENT)
Dept: PHARMACY | Facility: HOSPITAL | Age: 50
End: 2025-03-10
Payer: COMMERCIAL

## 2025-03-10 DIAGNOSIS — E11.65 UNCONTROLLED TYPE 2 DIABETES MELLITUS WITH HYPERGLYCEMIA: ICD-10-CM

## 2025-03-10 RX ORDER — DULAGLUTIDE 1.5 MG/.5ML
1.5 INJECTION, SOLUTION SUBCUTANEOUS WEEKLY
Qty: 2 ML | Refills: 11 | Status: SHIPPED | OUTPATIENT
Start: 2025-03-10

## 2025-03-10 NOTE — PROGRESS NOTES
Clinical Pharmacy Appointment    Patient ID: Milagros Forde is a 50 y.o. female who presents for Diabetes.    Pt is here for Follow Up appointment.     Referring Provider: Maldonado Cole MD  PCP: Maldonado Cole MD   Last visit with PCP: 2/11/25   Next visit with PCP: 4/29/25    Subjective     Interval History  Tolerating current DM regimen well.  Greater variation in glucose readings since previous visit due to increased snacking at night and potential missed Trulicity dose.  Switched from lisinopril to olmesartan. Coughing improved. 116/67 today.     HPI  DIABETES MELLITUS TYPE II:    Diagnosed with diabetes: diagnosed around age 28. Known diabetic complications: Retinopathy and neuropathy.  Does patient follow with Endocrinology: To establish with endo this week.   Last optometry exam: some vision changes - advised to schedule with ophthalmology. Visit in April 2025.  Most recent visit in Podiatry: To establish 4/28/25 -- patient denies sores or cuts on feet today      Current diabetic medications include:  Trulicity 0.75 mg once weekly  Humalog Kwikpen SSI  2 units for blood sugars 101-150  4 units for blood sugars 151-200  6 units for blood sugars 201-250  8 units for blood sugars 251-300  10 units for blood sugars 301-350  12 units for blood sugars 351-400   call MD for >400  Lantus 20 units at bedtime    Clarifications to above regimen: None  Adverse Effects: None    Past diabetic medications include:  Metformin (disliked the smell and had difficulty taking large tablet)    Glucose Readings:  Glucometer/CGM Type: One Touch Verio  Patient tests BG 3 times per day - morning, meals, and bedtime.  Not interested in CGM at this time.     Current home BG readings (mg/dL): highest readings 271 mg/dL, now 190 mg/dL, lowest reading around 93 mg/dL.  Previous home BG readings (mg/dL): 300-400 mg/dL    Any episodes of hypoglycemia? No, no lows reported . Did patient treat episode of hypoglycemia appropriately?  Yes, has serving of carbohydrates and skipped insulin   Does the patient have a prescription for ready-to-use Glucagon? N/a    Lifestyle:  Diet: 3 meals/day. Significant reduction in appetite on Trulicity. Reducing carbohydrate and salt intake. Uses salt substitutes at home. Breakfast, 3 half pieces of ppee on half sandwich with yogurt (1 carb) or omelette with 1/2 english muffin and coffee with cream. Lunch, maybe lunch meat with low sodium (struggles with low sodium options for lunch). Dinner, chicken with noodles, rice, or 1 potato with vegetables. Snacks: grapes. Cut out ramen noodles. High glucose readings related to snacking late at night. Uses Mrs. David garcia.  Physical Activity: takes public transportation and walks a lot, busy lifestyle, no structured exercise.   Tobacco history: None     Secondary Prevention:  Statin? Yes - Rosuvastatin 10 mg daily   ACE-I/ARB? Yes - Olmesartan 20 mg daily.  Aspirin? No    Pertinent PMH Review:  PMH of Pancreatitis: No  PMH of Retinopathy: Yes  PMH of Urinary Tract Infections: No  PMH of MTC: No  PMH of ASCVD: No   PMH of CKD: No   PMH of CHF: No   PMH of Obesity: No   UACR/EGFR in last year?: Yes  ALBUMIN/CREATININE RATIO, RANDOM URINE   Date Value Ref Range Status   01/28/2025 229 (H) <30 mg/g creat Final     Comment:        The ADA defines abnormalities in albumin  excretion as follows:     Albuminuria Category        Result (mg/g creatinine)     Normal to Mildly increased   <30  Moderately increased            Severely increased           > OR = 300     The ADA recommends that at least two of three  specimens collected within a 3-6 month period be  abnormal before considering a patient to be  within a diagnostic category.       Albumin/Creatine Ratio   Date Value Ref Range Status   11/23/2020 41.0 (H) 0.0 - 30.0 ug/mg crt Final     Immunizations:  Influenza? No  COVID? No  Pneumonia? No  Shingles? Yes  Hepatitis B? No    Medication Reconciliation:  No  "changes     Drug Interactions  No relevant drug interactions were noted.    Medication System Management  Patient's preferred pharmacy: [facility]   Adherence/Organization: No issues   Affordability/Accessibility: No issues     Objective   Allergies   Allergen Reactions    Lisinopril Cough     Social History     Social History Narrative    Not on file      Medication Review  Current Outpatient Medications   Medication Instructions    blood pressure monitor kit 1 each, miscellaneous, Daily    blood-glucose meter (OneTouch Ultra2 Meter) misc 1 each, miscellaneous, Daily    estradiol (Estrace) 0.01 % (0.1 mg/gram) vaginal cream Apply a pea-sized amount to the entrance of the vagina bedtime for 2 weeks, then at bedtime twice a week.    gabapentin (NEURONTIN) 300 mg, oral, Nightly    insulin glargine (LANTUS) 20 Units, subcutaneous, Every 24 hours, Take as directed per insulin instructions    insulin lispro (HumaLOG KwikPen Insulin) 100 unit/mL injection Take as directed per insulin instructions before meals and at bedtime 2 units for blood sugars 101-150 4 units 151-200  6 units 201-250  8 units 251-300 10 units 301-350  12 units 351-400  call MD for >400    lancets 33 gauge misc 1 Lancet, miscellaneous, 4 times daily    levothyroxine (SYNTHROID, LEVOXYL) 25 mcg, oral, Daily, Take on an empty stomach at the same time each day, either 30 to 60 minutes prior to breakfast    olmesartan (BENICAR) 20 mg, oral, Daily    OneTouch Ultra Test strip 1 strip, miscellaneous, 4 times daily, Before meals and at bedtime    pen needle, diabetic (BD Ultra-Fine Mini Pen Needle) 31 gauge x 3/16\" needle 1 each, miscellaneous, 4 times daily    rosuvastatin (CRESTOR) 10 mg, oral, Daily    Trulicity 0.75 mg, subcutaneous, Weekly      Vitals  BP Readings from Last 2 Encounters:   02/11/25 134/84   01/31/25 100/54     BMI Readings from Last 1 Encounters:   02/11/25 24.09 kg/m²      Labs  A1C  Lab Results   Component Value Date    HGBA1C 15 (A) " 01/28/2025    HGBA1C 13.2 (A) 08/08/2022    HGBA1C 12.0 11/23/2020     BMP  Lab Results   Component Value Date    CALCIUM 9.7 01/29/2025     (L) 01/29/2025    K 4.7 01/29/2025    CO2 19 (L) 01/29/2025    CL 97 (L) 01/29/2025    BUN 20 01/29/2025    CREATININE 0.75 01/29/2025    EGFR 97 01/29/2025     LFTs  Lab Results   Component Value Date    ALT 11 01/29/2025    AST 12 01/29/2025    ALKPHOS 88 01/29/2025    BILITOT 0.4 01/29/2025     FLP  Lab Results   Component Value Date    TRIG 100 01/29/2025    CHOL 389 (H) 01/29/2025    LDLF 257 (H) 08/08/2022    LDLCALC 285 (H) 01/29/2025    HDL 81 01/29/2025     Urine Microalbumin  Lab Results   Component Value Date    MICROALBCREA 229 (H) 01/28/2025     Weight Management  Wt Readings from Last 3 Encounters:   02/11/25 61.7 kg (136 lb)   01/31/25 62.1 kg (137 lb)   01/29/25 62.1 kg (137 lb)      There is no height or weight on file to calculate BMI.     Assessment/Plan   Problem List Items Addressed This Visit       Uncontrolled type 2 diabetes mellitus with hyperglycemia     Patient's goal A1c is < 7.0%.  Is pt at goal? No, most recent A1c on 1/28/25 of 15.0%. Prior A1c was 13.2% on 8/8/22. Next A1c due after 4/28/25.    Rationale for plan: Patient's SMBGs are slightly worse when compared to previous readings. Patient reports BG levels between  mg/dL. Endorses resolution of low BG events since adjusting sliding scale. Patient continues to check BG as directed 5 times daily via finger stick. Discussed CGM and patient is not interested at this time. Current DM regimen includes Trulicity 0.75 mg once weekly on Wednesdays, Humalog mild sliding scale with meals and at bedtime, and Lantus 20 units at bedtime. Patient reports that she is tolerating current regimen well. Given recent increase in BG readings discussed increase in Trulicity versus Lantus dose. Patient is agreeable to increase in Trulicity. She has 2 pens remaining of current dose and will increase after  current supply is used up. Will continue to monitor. Plan for follow up in 2 weeks to review BG readings.     Medication Changes:  CONTINUE:   Humalog mild sliding scale with meals and at bedtime  2 units for blood sugars 101-150  4 units for blood sugars 151-200  6 units for blood sugars 201-250  8 units for blood sugars 251-300  10 units for blood sugars 301-350  12 units for blood sugars 351-400   call MD for >400  Lantus 20 units at bedtime  INCREASE:   Trulicity 1.5 mg once weekly on Wednesdays    Future Considerations:  Titration of GLP1 - will monitor weight closely and proceed with slow titration.  Dose adjust insulin if needed     Monitoring and Education:  Hypoglycemia occurs when your blood sugars fall below 70 mg/dL.  Symptoms may include feeling weak, shaky, sweaty, irritable, or hungry.   If these symptoms occur it is best to check your blood sugar.   To treat hypoglycemia, it is recommended to consume 15 grams of carbohydrates and recheck your sugar in 15 minutes. If your sugars remain below 70 mg/dL, then you should have another serving of 15 grams of carbohydrates. Once your sugar is greater than 70 mg/dL, you should eat a meal to prevent your sugars from falling.   15 grams of carbohydrates may include glucose tablets, glucose gel, 4 ounces of soda or juice, 1 tablespoon of honey or sugar, or hard candies.     Clinical Pharmacist follow-up: 4/7/25 @ 1320, Telehealth visit    Continue all meds under the continuation of care with the referring provider and clinical pharmacy team.    Thank you,  Marva Mccoy, PharmD  Clinical Pharmacist  [phone]    Verbal consent to manage patient's drug therapy was obtained from the patient. They were informed they may decline to participate or withdraw from participation in pharmacy services at any time.

## 2025-03-12 NOTE — PROGRESS NOTES
Endocrinology  03/13/25     History of Present Illness   Milagros Forde is a 50 y.o. year old female with medical history of T2DM, HTN, subclinical hypothyroidism, here for T2DM management and subclinical hypothyroidism.     Referred to us after an uncontrolled HbA1c of 15% determined by PCP. Per prior HbA1c was 13.2% in 2022. She declined CGM at her PCP appt. Her dose of trulicity was increased 0.75 -> 1.5 mg weekly. Her insulin regimen was maintained at 20 units at bedtime lantus and 2:50 >100 humalog prior to meals.    She has noted that since taking trulicity she has found she needs to force herself to eat. She forgot to take her trulicity last week, and so she would wake up at 2-3A and eat so fasting blood sugars are not truly fasting. When she remembers to take the trulicity fasting BG are .    She states that she is frequently lightheaded. She is lightheaded today and BG is 142 on her own meter. Lightheadedness occurs every day, and is mostly associated with activity. She also endorses a difficult time adjusting her her vision from dark to light. Lightheadedness improves when she rests or is still. Positional changes also exacerbate lightheadedness. Issues with blood pressure just started in 1/2025.     For hypothyroidism the patient was started on 25 mcg in 1/2025 for subclinical hypothyroidism (overt hypothyroidism in 8/2022). She had been on thyroid replacement years before that. Etiology of hypothyroidism is post-ablation. 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    Current TRT: 25 mcg daily, takes at 430-5A, breakfast between 530-730  Ca/Fe/Mg/PPI/biotin: none    Family hx: T2DM mom, grandfather (T1D), uncle (T2D), aunt (pre-DM); cousin with hyperthyroidism      Diabetes Summary   Diagnosis Date:    ~2000; had glucosuria for years before that but HbA1c never consistent with diabetes. At the time of diagnosis she was having  polydipsia and polyuria. She had one child prior to DM dx and was never diagnosed with gestational diabetes.       Glucose Meter: OneTouch   Diabetes Tech (CGM or pump):    denies          Eye Exam:  end of 2024, appt scheduled for 4/2025, known retinopathy. Goes to a retinopathy center is [place]  Foot Exam:    seeing podiatry 4/2025, known hx of neuropathy                  Dental exam:       2/2025, due for follow up            Diet:      B: 530-730A english muffin with egg and breakfast meat (30g)  L: 1130-1P chicken nuggets, sandwich (hawaiian rolls, lunch meat, cheese)  D: 530-6P if she cooks she eats wings, hot dogs               Tries to keep all meals under 30 g CHO  Her current regimen is as follows:   Orals:  denies                 Injectables: trulicity 0.75 mg weekly (will start 1.5 after two more 0.75 mg injections, injects Wed)  Insulin: glargine 20u daily, lispro 2:50 >100 but tends to just decide how much based on what she is eating  Prior meds: metformin (dislikes the smell of it)  Hx of pancreatitis: denies  Hx of medullary thyroid cancer: denies  Complications: neuropathy, retinopathy  Last episode of DKA or hyperosmolar coma:  denies    BG Evaluation  Home blood glucose log:  Frequency of BG checks: before every meal  Fasting values:  when on Trulicity  Non-fasting values:     Hypoglycemia:  Frequency and timing of hypoglycemia: none since January   Time of day hypoglycemia usually occurs: unknown  Severity of hypoglycemic episode: 40-50s  Hypoglycemic threshold: <80  Hypoglycemia symptoms: lightheadedness, confusion  Last call to EMS or hospitalization: none    Review of Systems   General: Denies fever or chills   Head: Denies headache or vision changes   Neck: Denies tenderness or lumps   Cardiac: Denies chest pain or palpitations (chronic)   Respiratory: Denies shortness of breath (occasional) or cough   GI: Denies abdominal pain, nausea, or vomiting   Extremities: Denies  "swelling   Skin: Denies rash     Medications     Current Outpatient Medications   Medication Instructions    blood pressure monitor kit 1 each, miscellaneous, Daily    blood-glucose meter (OneTouch Ultra2 Meter) misc 1 each, miscellaneous, Daily    estradiol (Estrace) 0.01 % (0.1 mg/gram) vaginal cream Apply a pea-sized amount to the entrance of the vagina bedtime for 2 weeks, then at bedtime twice a week.    gabapentin (NEURONTIN) 300 mg, oral, Nightly    insulin glargine (LANTUS) 20 Units, subcutaneous, Every 24 hours, Take as directed per insulin instructions    insulin lispro (HumaLOG KwikPen Insulin) 100 unit/mL injection Take as directed per insulin instructions before meals and at bedtime 2 units for blood sugars 101-150 4 units 151-200  6 units 201-250  8 units 251-300 10 units 301-350  12 units 351-400  call MD for >400    lancets 33 gauge misc 1 Lancet, miscellaneous, 4 times daily    levothyroxine (SYNTHROID, LEVOXYL) 25 mcg, oral, Daily, Take on an empty stomach at the same time each day, either 30 to 60 minutes prior to breakfast    olmesartan (BENICAR) 20 mg, oral, Daily    OneTouch Ultra Test strip 1 strip, miscellaneous, 4 times daily, Before meals and at bedtime    pen needle, diabetic (BD Ultra-Fine Mini Pen Needle) 31 gauge x 3/16\" needle 1 each, miscellaneous, 4 times daily    rosuvastatin (CRESTOR) 10 mg, oral, Daily    Trulicity 1.5 mg, subcutaneous, Weekly        History     Past Medical History:   Diagnosis Date    Encounter for gynecological examination (general) (routine) without abnormal findings 09/17/2015    Well woman exam with routine gynecological exam    Encounter for other screening for malignant neoplasm of breast 09/17/2015    Breast cancer screening    Encounter for screening for infections with a predominantly sexual mode of transmission 09/17/2015    Screening for STD (sexually transmitted disease)    Encounter for screening for malignant neoplasm of cervix 09/17/2015    " "Cervical cancer screening    Hypothyroidism due to medicaments and other exogenous substances 10/07/2015    Hypothyroidism, iatrogenic    Other abnormal and inconclusive findings on diagnostic imaging of breast 09/25/2015    Abnormal mammogram of right breast    Other conditions influencing health status 09/29/2015    Type 2 diabetes mellitus, uncontrolled    Other specified disorders of teeth and supporting structures 11/09/2016    Pain, dental    Personal history of other endocrine, nutritional and metabolic disease 10/07/2015    History of type 2 diabetes mellitus    Personal history of other endocrine, nutritional and metabolic disease 09/29/2015    History of diabetic neuropathy    Personal history of other endocrine, nutritional and metabolic disease 10/07/2015    History of hypercholesterolemia    Vitamin D deficiency, unspecified 10/07/2015    Vitamin D deficiency       Past Surgical History:   Procedure Laterality Date    TUBAL LIGATION  09/29/2015    Tubal Ligation       Family History   Problem Relation Name Age of Onset    Hypertension Mother      Diabetes Mother      Other (Blindness) Mother          left eye        Allergies   Allergen Reactions    Lisinopril Cough        Social history: denies tobacco, etoh, and illicits     Physical Exam   /90   Pulse 101   Ht 1.626 m (5' 4\")   Wt 62.7 kg (138 lb 3.7 oz)   BMI 23.73 kg/m²    General: Well appearing, no acute distress  Head and Neck: NCAT  Neuro: alert and oriented  Heart: Normal rate and rhythm  Lungs: CTAB no RWR  Extremities: Warm, no edema  Skin: No rashes    Labs and Imaging     Lab Results   Component Value Date    HGBA1C 15 (A) 01/28/2025    HGBA1C 13.2 (A) 08/08/2022    HGBA1C 12.0 11/23/2020     (L) 01/29/2025    K 4.7 01/29/2025    CL 97 (L) 01/29/2025    CO2 19 (L) 01/29/2025    BUN 20 01/29/2025    CREATININE 0.75 01/29/2025    CALCIUM 9.7 01/29/2025    ALBUMIN 4.3 01/29/2025    PROT 7.2 01/29/2025    BILITOT 0.4 01/29/2025 "    ALKPHOS 88 01/29/2025    ALT 11 01/29/2025    AST 12 01/29/2025    GLUCOSE 434 (H) 01/29/2025    CHOL 389 (H) 01/29/2025    TRIG 100 01/29/2025    HDL 81 01/29/2025       Assessment and Plan   Sreekevyn Forde is a 50 y.o. year old female with medical history of T2DM, HTN, subclinical hypothyroidism, here for T2DM management and subclinical hypothyroidism.     Uncontrolled T2DM. Patient off medications for years prior to 1/2025, now trying to modify diet and stay on medications to improve glycemic control. Discussed the goal of increasing GLP-1RA and starting SGLT2i and decreasing insulin. We also discussed CGM and patient is more willing to consider it now.  She will meet with our diabetes educator to discuss dietary options and CGM.    #T2DM  - Dx ~2000   - Complications include: neuropathy, nephropathy, retinopathy, HLD  - Most recent A1c is 15 (1/2025). Goal a1c is <7  - Health maintenance:  ==> According to the ADA, BP goal is <130/80. Patient is above goal, working with PCP.  ==> Most recent retinal exam showed retinopathy. Due 4/2025, appt scheduled  ==> Most recent LDL is 285. Due for repeat in soon to assess statin efficacy. Goal LDL <70.  ==> Most recent urine alb/Cr ratio is 229. Due for repeat soon to assess ARB therapy.  ==> Foot exam completed by podiatry given known hx of neuropathy. Appt scheduled for 4/2025  ==> TSH (see below)  - Medication management:  ==> DECREASE lantus 18 units before meals  ==> START prandial insulin 5u lispro  ==> START correction insulin 1:50>150 TIDAC  ==> Agree with increasing Trulicity to 1.5 mg weekly; When she increases Trulicity, she will decrease her lantus from 18 units every day to 14 units every day  ==> START empagliflozin 10 mg daily    #Hypothyroidism  - Etiology: post-ablation (toxic nodule)  - Currently on 25 mcg LT4  - Repeat labs to assess for LT4 titration      RTC: 6W       I spent a total of 90 minutes on the date of the service which included preparing  to see the patient, face-to-face patient care, completing clinical documentation, obtaining and/or reviewing separately obtained history, performing a medically appropriate examination, counseling and educating the patient/family/caregiver, and ordering medications, tests, or procedures.        Elis Reyna MD   of Medicine  Department of Internal Medicine  [facility]  [facility]

## 2025-03-13 ENCOUNTER — OFFICE VISIT (OUTPATIENT)
Age: 50
End: 2025-03-13
Payer: COMMERCIAL

## 2025-03-13 ENCOUNTER — TELEPHONE (OUTPATIENT)
Age: 50
End: 2025-03-13

## 2025-03-13 ENCOUNTER — NUTRITION (OUTPATIENT)
Age: 50
End: 2025-03-13
Payer: COMMERCIAL

## 2025-03-13 VITALS
HEIGHT: 64 IN | SYSTOLIC BLOOD PRESSURE: 158 MMHG | HEART RATE: 101 BPM | DIASTOLIC BLOOD PRESSURE: 90 MMHG | BODY MASS INDEX: 23.6 KG/M2 | WEIGHT: 138.23 LBS

## 2025-03-13 DIAGNOSIS — E11.65 UNCONTROLLED TYPE 2 DIABETES MELLITUS WITH HYPERGLYCEMIA: ICD-10-CM

## 2025-03-13 DIAGNOSIS — E89.0 POSTABLATIVE HYPOTHYROIDISM: ICD-10-CM

## 2025-03-13 DIAGNOSIS — E11.65 UNCONTROLLED TYPE 2 DIABETES MELLITUS WITH HYPERGLYCEMIA: Primary | ICD-10-CM

## 2025-03-13 PROCEDURE — 3077F SYST BP >= 140 MM HG: CPT | Performed by: STUDENT IN AN ORGANIZED HEALTH CARE EDUCATION/TRAINING PROGRAM

## 2025-03-13 PROCEDURE — 3080F DIAST BP >= 90 MM HG: CPT | Performed by: STUDENT IN AN ORGANIZED HEALTH CARE EDUCATION/TRAINING PROGRAM

## 2025-03-13 PROCEDURE — 3008F BODY MASS INDEX DOCD: CPT | Performed by: STUDENT IN AN ORGANIZED HEALTH CARE EDUCATION/TRAINING PROGRAM

## 2025-03-13 PROCEDURE — 4010F ACE/ARB THERAPY RXD/TAKEN: CPT | Performed by: STUDENT IN AN ORGANIZED HEALTH CARE EDUCATION/TRAINING PROGRAM

## 2025-03-13 PROCEDURE — 99215 OFFICE O/P EST HI 40 MIN: CPT | Performed by: STUDENT IN AN ORGANIZED HEALTH CARE EDUCATION/TRAINING PROGRAM

## 2025-03-13 PROCEDURE — 99417 PROLNG OP E/M EACH 15 MIN: CPT | Performed by: STUDENT IN AN ORGANIZED HEALTH CARE EDUCATION/TRAINING PROGRAM

## 2025-03-13 PROCEDURE — 99211 OFF/OP EST MAY X REQ PHY/QHP: CPT | Performed by: STUDENT IN AN ORGANIZED HEALTH CARE EDUCATION/TRAINING PROGRAM

## 2025-03-13 PROCEDURE — 99205 OFFICE O/P NEW HI 60 MIN: CPT | Performed by: STUDENT IN AN ORGANIZED HEALTH CARE EDUCATION/TRAINING PROGRAM

## 2025-03-13 ASSESSMENT — PATIENT HEALTH QUESTIONNAIRE - PHQ9
SUM OF ALL RESPONSES TO PHQ9 QUESTIONS 1 AND 2: 0
1. LITTLE INTEREST OR PLEASURE IN DOING THINGS: NOT AT ALL
2. FEELING DOWN, DEPRESSED OR HOPELESS: NOT AT ALL

## 2025-03-13 ASSESSMENT — ENCOUNTER SYMPTOMS
OCCASIONAL FEELINGS OF UNSTEADINESS: 0
LOSS OF SENSATION IN FEET: 1
DEPRESSION: 0

## 2025-03-13 NOTE — PATIENT INSTRUCTIONS
After Visit Summary  It was great seeing you today!    In summary from our visit today, I would like to remind you of the following:    - DECREASE lantus 18 units before meals  - START taking 5 units of insulin 15 min before each meal PLUS a 1 unit for every 50 pt >150 correction scale based on your pre-meal blood sugar  1:50 correction  151-200 = 1 unit;  201-250 = 2 units;  251-300 = 3 units;  301-350 = 4 units;  351-400 = 5 units   - Agree with increasing your trulicity  - When you increase Trulicity, decrease your lantus from 18 units every day to 14 units every day  - START empagliflozin 10 mg daily  - Get thyroid labs at your convenience    Division of Endocrinology   Follow-up appointments:  Please arrive at least 20 minutes prior to your follow up appointments in order to keep visits as close as possible to the scheduled times. If you need to cancel an appointment, please call at least 24 hours in advance.    Please bring your medications or an updated list of medications to each of your visits to help ensure safety in prescribing future medications.    If you are being seen for diabetes, please bring your glucose meter and/or glucose log with 2 weeks of glucose readings to each visit.    Medication Refills: Please request medication refills at the time of your appointment.   - Refills requested by phone or NoWaithart in between visits require at least 3 business days to be processed.    Lab Results: Please allow at least 7 business days for lab results to be reviewed.  - Please note, that some specialty testing may take up to at least 7 business to be completed.   - If there are any urgent issues requiring immediate attention, we will contact you at your provided phone numbers.   - Any non-urgent results will be relayed via Zank if you have signed up for this service or via a letter through the mail and/or phone call.     Communication with your provider:  - [facility] Zank is highly  recommended as the most efficient means to contact your provider. However for urgent concerns please call.   - For phone calls, please call our office Monday- Friday 8am to 4:30pm and your message will be relayed to your provider. Please allows 1-2 business days for a response.  - After hours messages are left with an answering service and will be handled by the clinic the following business day.      Sincerely,   Elis Reyna MD  Division of Endocrinology  [facility]

## 2025-03-13 NOTE — PROGRESS NOTES
Reason for Visit:  Milagros Forde is a 50 y.o. female who presents for Initial DSME Visit    DSME - Global Assessment    Referring Provider: Elis Reyna MD  Marital Status: single.  Support Person:  here today alone.  Has family .    What do you hope to gain from this diabetes education visit? Review of carb counting, and additional information on CGM therapy, and why    Have you had diabetes education in the past?  Yes. When: 15 years ago .  In Your words, what is Diabetes: My body just is not making enough insulin.    What Concerns you most about having diabetes: review of diet and how to have make my body produce more insulin to get rid of diabetes    Readiness to Learn: demonstrates willingness to learn and demonstrates ability to learn  Preferred learning method: reading, listening, and doing    Household Composition: living independently, with family    Demographics:   Difficulties with: None  Highest Level of Education: High School  Race/Ethnic Origin: /Black  Occupation:  n/a  Work hours: n/a    Health Status:  Smoking/Tobacco Use: No, patient does not smoke or use tobacco.  Alcohol Use: No, patient does not drink alcohol.    Type of Diabetes: Type 2  What year were you diagnosed with diabetes: about 20 years ago  Family History: yes both type 1 and type 2     Comorbidities/Chronic Complications: hypertension and neuropathy hypothyroidism    Lab Results   Component Value Date    HGBA1C 15 (A) 01/28/2025    HGBA1C 13.2 (A) 08/08/2022    HGBA1C 12.0 11/23/2020    HGBA1C 12.5 01/08/2018     Health Utilization Past 12 Months:   Hospital Admissions: No.  ER Visits: Yes. Number of Visits: 2.  Primary Care Visits: Yes. Number of Visits: 1 just recently.  Last Eye Exam : has appt 4/2025  Podiatry : due now  Dentist : Last Visit: spring 2024    Diabetes Self-Management Skills and Behaviors:   Do you exercise regularly?: No.  Physical Activity : walking  No. Average amount of hours slept per night:  varies a lot  How do you manage your diabetes when you are sick?: unsure and call doctor    Diabetes Medications: insulin pens, oral agents, and non-insulin injectables  Trulicity 0.75 mg will increase to 1.5 mg, in 3 weeks  Lantus 20 units every day, plans to decrease to 18 units when she increases her Trulicity  Humalog 5 units with each meal+ SSI 1:50>150  Jardiance 10 mg every day ordered today    Current Outpatient Medications   Medication Sig Dispense Refill    blood pressure monitor kit 1 each once daily. 1 kit 0    blood-glucose meter (OneTouch Ultra2 Meter) misc 1 each once daily.      dulaglutide (Trulicity) 1.5 mg/0.5 mL pen injector injection Inject 1.5 mg under the skin 1 (one) time per week. 2 mL 11    empagliflozin (Jardiance) 10 mg Take 1 tablet (10 mg) by mouth once daily. 90 tablet 3    estradiol (Estrace) 0.01 % (0.1 mg/gram) vaginal cream Apply a pea-sized amount to the entrance of the vagina bedtime for 2 weeks, then at bedtime twice a week. 34 g 3    gabapentin (Neurontin) 300 mg capsule Take 1 capsule (300 mg) by mouth once daily at bedtime. 30 capsule 3    insulin glargine (Lantus) 100 unit/mL injection Inject 20 Units under the skin once every 24 hours. Take as directed per insulin instructions 15 mL 3    insulin lispro (HumaLOG KwikPen Insulin) 100 unit/mL injection Take as directed per insulin instructions before meals and at bedtime 2 units for blood sugars 101-150 4 units 151-200  6 units 201-250  8 units 251-300 10 units 301-350  12 units 351-400  call MD for >400 15 mL 3    lancets 33 gauge misc 1 Lancet 4 times a day. 400 each 3    levothyroxine (Synthroid, Levoxyl) 25 mcg tablet Take 1 tablet (25 mcg) by mouth early in the morning.. Take on an empty stomach at the same time each day, either 30 to 60 minutes prior to breakfast 30 tablet 3    olmesartan (BENIcar) 20 mg tablet Take 1 tablet (20 mg) by mouth once daily. 30 tablet 5    OneTouch Ultra Test strip 1 strip 4 times a day.  "Before meals and at bedtime      pen needle, diabetic (BD Ultra-Fine Mini Pen Needle) 31 gauge x 3/16\" needle 1 each 4 times a day. 400 each 3    rosuvastatin (Crestor) 10 mg tablet Take 1 tablet (10 mg) by mouth once daily. 30 tablet 3     No current facility-administered medications for this visit.     Injection/Infusion Sites: abdominal wall and thigh(s). Appropriate disposal of sharps. Appropriate storage of insulin. Keeps unopen insulin in refrigerator .    Monitorng: frequency: usually  every day- tid      Time In Range  mg/dl: 52 %  Time Below Range <70mg/dl: 0 %   Time Below Range <54mg/dl: 0 %  Time Above Range >180mg/dl: 38 %  Time Above Range >250mg/dl: 10%  Glucose Variability: 52 %    Minimum of 72 hours of data were reviewed.    Treatment adjustments/recommendations based on this CGM review and interpretation: Uncontrolled diabetes.  Limited BGM, indication of not using her SSI with meals.      . Acute Complications-Safety: none    Hypoglycemia: None, recently  Hypoglycemia Treatment: eat sweets    Hyperglycemia: frequency: every few days, polyuria, and blurred vision  Hyperglycemia Treatment: need to decrease my carb's in my meals.      Diabetes Assessment:   DM Interferes with other aspects of my life: agree, at times  My level of stress is high: agree, to some  I struggle with making changes in my life: agree.  How do you handle stress: just try to work through it  Most difficult part of managing DM: medication, the numbness I have in my hands, and testing all the time    DSME - Meal Planning and Diet Recall  Are you currently following any meal plan: Low Carbohydrates and tries to keep under 30 grams of CHO per meal .    Does your culture or Muslim require any of the following:  none  Who does the grocery shopping? patient  Who does the cooking in the home? patient    How often do you eat out? sometimes  How many meals do you eat in per day: three.  Which meals do you tend to skip: none- " rarely  What do you drink with and between meals: water and diet drinks.  Trying not to drink a lot diet drinks.    Diet Recall:   B: 530-730A english muffin with egg and breakfast meat (30g)  L: 1130-1P chicken nuggets ( 8 wings =30 grams per label), sandwich (hawaiian rolls, lunch meat, cheese)  D: 530-6P if she cooks she eats wings, hot dogs                 DSME - Goals and Recommendations  [facility] Diabetes Education Program SMART Behavior Goal Setting:        S - Specific: Exactly what do you want to do        M - Measurable: Use a calendar or chart to track progress        A - Attainable: Take small steps to make bigger changes        R - Realistic: Pick something reasonable that you know you can do        T - Time Oriented: Choose a time limit (No longer than 6 months)    Specific Goal:   I will count carbohydrates at each meal.   I will take my diabetes medication as directed by my doctor.    Measurable: How will I track my goal:  I will keep track of my progress daily by  following by BGM pattern .    Time Oriented: four weeks.    Topics Covered and Impression:    DSME Topics Covered During Visit:   Reviewing Medication Classes  Taking Medications as Prescribed  Ways to Deal With Diabetes Symptoms  Glycemic Pattern Management  Healthy Meal Plan  What is CGM, how it is used by the patient, benefits of using CGM with insulin therapy.  Hands on demo provided of CGM.  Carbohydrate counting.  Patient is going between serving of carb's and grams.  Encourage patient to use either grams or serving not both, for accurate evaluation of her food intake.    Reviewed Diabetes Technology: CGM usage Freestyle Teja 3 plus or Dexcom 7 introduction     DSME Topics for Follow-Up:   Glucagon Teaching  Review Glycemic Goals (CGM or SMBG)  Reviewed Hypoglycemia Signs/Symptoms/Tx Plan  Reviewed Hyperglycemia Signs/Symptoms/Tx Plan  Ways to Deal With Diabetes Symptoms  Glycemic Pattern Management    Materials provided  during today's visit: Brndstr healthy meal planning 2022,  ADA [facility] Continuous Glucose Monitoring: helping you make lifestyle choices for improved glucose management.      Provider Impression: Patient is open to learning about CGM and possibly using this device.  Offer patient 2 week trail CGM.  Decline today.  I will follow up in 1-2 weeks to see if she would like to try CGM, at that time.   She is more interested in dietary changes than monitoring.       Time: I personally spent a total of 30 minutes with the patient providing diabetes self-management education. Visit documentation will be sent electronically to referring provider.     Provider in office today: MD Nani Morocho RN, BSN, [facility]

## 2025-03-14 ENCOUNTER — APPOINTMENT (OUTPATIENT)
Dept: OBSTETRICS AND GYNECOLOGY | Facility: CLINIC | Age: 50
End: 2025-03-14
Payer: COMMERCIAL

## 2025-03-14 VITALS
WEIGHT: 137 LBS | HEIGHT: 64 IN | DIASTOLIC BLOOD PRESSURE: 56 MMHG | SYSTOLIC BLOOD PRESSURE: 98 MMHG | BODY MASS INDEX: 23.39 KG/M2

## 2025-03-14 DIAGNOSIS — N95.2 ATROPHIC VAGINITIS: Primary | ICD-10-CM

## 2025-03-14 PROCEDURE — 1036F TOBACCO NON-USER: CPT | Performed by: STUDENT IN AN ORGANIZED HEALTH CARE EDUCATION/TRAINING PROGRAM

## 2025-03-14 PROCEDURE — 99212 OFFICE O/P EST SF 10 MIN: CPT | Performed by: STUDENT IN AN ORGANIZED HEALTH CARE EDUCATION/TRAINING PROGRAM

## 2025-03-14 PROCEDURE — 3078F DIAST BP <80 MM HG: CPT | Performed by: STUDENT IN AN ORGANIZED HEALTH CARE EDUCATION/TRAINING PROGRAM

## 2025-03-14 PROCEDURE — 4010F ACE/ARB THERAPY RXD/TAKEN: CPT | Performed by: STUDENT IN AN ORGANIZED HEALTH CARE EDUCATION/TRAINING PROGRAM

## 2025-03-14 PROCEDURE — 3008F BODY MASS INDEX DOCD: CPT | Performed by: STUDENT IN AN ORGANIZED HEALTH CARE EDUCATION/TRAINING PROGRAM

## 2025-03-14 PROCEDURE — 3074F SYST BP LT 130 MM HG: CPT | Performed by: STUDENT IN AN ORGANIZED HEALTH CARE EDUCATION/TRAINING PROGRAM

## 2025-03-14 ASSESSMENT — PAIN SCALES - GENERAL: PAINLEVEL_OUTOF10: 0-NO PAIN

## 2025-03-14 ASSESSMENT — PATIENT HEALTH QUESTIONNAIRE - PHQ9
2. FEELING DOWN, DEPRESSED OR HOPELESS: NOT AT ALL
1. LITTLE INTEREST OR PLEASURE IN DOING THINGS: NOT AT ALL
SUM OF ALL RESPONSES TO PHQ9 QUESTIONS 1 AND 2: 0

## 2025-03-14 NOTE — PROGRESS NOTES
"Subjective   Patient ID: Milagros Forde is a 50 y.o. female who presents for Follow-up (Patient is here to discuss effects of estrogen cream.patient reports cream is helping a great deal/No questions or concerns ).  Reports resolution of itching everywhere except occasional itching still occurring at groin creases where skin is in contact with her underwear gusset.        Review of Systems   All other systems reviewed and are negative.      Objective   Physical Exam  Constitutional:       Appearance: Normal appearance.   HENT:      Head: Normocephalic and atraumatic.      Nose: Nose normal.      Mouth/Throat:      Mouth: Mucous membranes are moist.      Pharynx: No oropharyngeal exudate or posterior oropharyngeal erythema.   Eyes:      Extraocular Movements: Extraocular movements intact.      Conjunctiva/sclera: Conjunctivae normal.   Pulmonary:      Effort: Pulmonary effort is normal.   Musculoskeletal:      Cervical back: Normal range of motion.   Skin:     Findings: No bruising, erythema, lesion or rash.   Neurological:      General: No focal deficit present.      Mental Status: She is alert.   Psychiatric:         Mood and Affect: Mood normal.         Behavior: Behavior normal.         Thought Content: Thought content normal.         Judgment: Judgment normal.         Assessment/Plan   - reviewed strategies for decreasing itching at groin creases, \"vulva friendly\" underclothing and more frequent changes if needed to stay dry  - reviewed use of estrogen cream including that she may begin trial of decreasing frequency to the point that symptoms remain controlled, with many users experiencing good symptom control with 2-4 times weekly application  - advised fuv for well check in January 2026, sooner if needs arise         Renita Banda MD 03/14/25 10:19 AM   "

## 2025-03-15 LAB
T4 FREE SERPL-MCNC: 0.8 NG/DL (ref 0.8–1.8)
TSH SERPL-ACNC: 3.26 MIU/L

## 2025-03-28 ENCOUNTER — APPOINTMENT (OUTPATIENT)
Age: 50
End: 2025-03-28
Payer: COMMERCIAL

## 2025-04-01 DIAGNOSIS — E03.9 HYPOTHYROIDISM, UNSPECIFIED TYPE: ICD-10-CM

## 2025-04-01 RX ORDER — LEVOTHYROXINE SODIUM 25 UG/1
25 TABLET ORAL DAILY
Qty: 90 TABLET | Refills: 1 | Status: SHIPPED | OUTPATIENT
Start: 2025-04-01 | End: 2026-04-01

## 2025-04-07 ENCOUNTER — APPOINTMENT (OUTPATIENT)
Dept: PHARMACY | Facility: HOSPITAL | Age: 50
End: 2025-04-07
Payer: COMMERCIAL

## 2025-04-23 NOTE — PROGRESS NOTES
Endocrinology  04/24/25     History of Present Illness   Milagros Forde is a 50 y.o. year old female with medical history of T2DM, HTN, subclinical hypothyroidism, here for T2DM management and subclinical hypothyroidism.     Interim hx:  She states that she is doing relatively well. She did have a run of blood sugars in the 200s post-prandially following meals due to dietary indiscretions, but has gone back to a more low carb diet.     Denies any yeast infections on jardiance. Today she will be taking the 4th injection of trulicity 1.5 mg. This week she has noticed nausea which she relates to COVID sequellae, not the trulidity.    She endorses baseline numbness/tingling in her feet. Occasionally keeps her up at night. She is going to podiatry next week.    History of presenting illness:  Referred to us after an uncontrolled HbA1c of 15%. Prior HbA1c was 13.2% in 2022. She declined CGM at her PCP appt. Her dose of trulicity was increased 0.75 -> 1.5 mg weekly. Her insulin regimen was maintained at 20 units at bedtime lantus and 2:50 >100 humalog prior to meals. She had some overnight lows once starting trulicity.     She states that she is frequently lightheaded. She is lightheaded today and BG is 142 on her own meter. Lightheadedness occurs every day, and is mostly associated with activity. She also endorses a difficult time adjusting her her vision from dark to light. Lightheadedness improves when she rests or is still. Positional changes also exacerbate lightheadedness. Issues with blood pressure just started in 1/2025.     For hypothyroidism the patient was started on 25 mcg in 1/2025 for subclinical hypothyroidism (overt hypothyroidism in 8/2022). She had been on thyroid replacement years before that. Etiology of hypothyroidism is post-ablation. 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    Current TRT: 25 mcg daily, takes at 430-5A,  breakfast between 530-730  Ca/Fe/Mg/PPI/biotin: none    Family hx: T2DM mom, grandfather (T1D), uncle (T2D), aunt (pre-DM); cousin with hyperthyroidism      Diabetes Summary   Diagnosis Date:    ~2000; had glucosuria for years before that but HbA1c never consistent with diabetes. At the time of diagnosis she was having polydipsia and polyuria. She had one child prior to DM dx and was never diagnosed with gestational diabetes.       Glucose Meter: OneTouch   Diabetes Tech (CGM or pump):    denies          Eye Exam:  end of 2024, appt scheduled for 4/2025, known retinopathy. Goes to a retinopathy center is [place]  Foot Exam:    seeing podiatry 4/2025, known hx of neuropathy                  Dental exam:       2/2025, due for follow up            Diet:      B: 530-730A english muffin with egg and breakfast meat (30g)  L: 1130-1P chicken nuggets, sandwich (hawaiian rolls, lunch meat, cheese)  D: 530-6P if she cooks she eats wings, hot dogs               Tries to keep all meals under 30 g CHO  Her current regimen is as follows:   Orals: empagliflozin 10 mg daily              Injectables: trulicity 1.5 mg weekly   Insulin: glargine 13u daily, lispro 5 with meals, lispro correction 1:50 >150  Prior meds: metformin (dislikes the smell of it)  Hx of pancreatitis: denies  Hx of medullary thyroid cancer: denies  Complications: neuropathy, retinopathy  Last episode of DKA or hyperosmolar coma:  denies    BG Evaluation  Home blood glucose log:  Frequency of BG checks: 1-3x/day  Fasting values: 112-221  Non-fasting values:     Hypoglycemia:  Frequency and timing of hypoglycemia: none since January   Time of day hypoglycemia usually occurs: unknown  Severity of hypoglycemic episode: 40-50s  Hypoglycemic threshold: <80  Hypoglycemia symptoms: lightheadedness, confusion  Last call to EMS or hospitalization: none    Review of Systems   General: Denies fever or chills   Head: Denies headache or vision changes   Neck:  "Denies tenderness or lumps   Cardiac: Denies chest pain or palpitations   Respiratory: Denies shortness of breath or cough   GI: Denies abdominal pain, nausea, or vomiting   Extremities: Denies swelling   Skin: Denies rash     Medications     Current Outpatient Medications   Medication Instructions    blood pressure monitor kit 1 each, miscellaneous, Daily    blood-glucose meter (OneTouch Ultra2 Meter) misc 1 each, Daily    empagliflozin (JARDIANCE) 10 mg, oral, Daily    estradiol (Estrace) 0.01 % (0.1 mg/gram) vaginal cream Apply a pea-sized amount to the entrance of the vagina bedtime for 2 weeks, then at bedtime twice a week.    gabapentin (NEURONTIN) 300 mg, oral, Nightly    insulin glargine (LANTUS) 20 Units, subcutaneous, Every 24 hours, Take as directed per insulin instructions    insulin lispro (HumaLOG KwikPen Insulin) 100 unit/mL injection Take as directed per insulin instructions before meals and at bedtime 2 units for blood sugars 101-150 4 units 151-200  6 units 201-250  8 units 251-300 10 units 301-350  12 units 351-400  call MD for >400    lancets 33 gauge misc 1 Lancet, miscellaneous, 4 times daily    levothyroxine (SYNTHROID, LEVOXYL) 25 mcg, oral, Daily, Take on an empty stomach at the same time each day, either 30 to 60 minutes prior to breakfast    olmesartan (BENICAR) 20 mg, oral, Daily    OneTouch Ultra Test strip 1 strip, 4 times daily    pen needle, diabetic (BD Ultra-Fine Mini Pen Needle) 31 gauge x 3/16\" needle 1 each, miscellaneous, 4 times daily    rosuvastatin (CRESTOR) 10 mg, oral, Daily    Trulicity 1.5 mg, subcutaneous, Weekly        History     Past Medical History:   Diagnosis Date    Encounter for gynecological examination (general) (routine) without abnormal findings 09/17/2015    Well woman exam with routine gynecological exam    Encounter for other screening for malignant neoplasm of breast 09/17/2015    Breast cancer screening    Encounter for screening for infections with a " "predominantly sexual mode of transmission 09/17/2015    Screening for STD (sexually transmitted disease)    Encounter for screening for malignant neoplasm of cervix 09/17/2015    Cervical cancer screening    Hypothyroidism due to medicaments and other exogenous substances 10/07/2015    Hypothyroidism, iatrogenic    Other abnormal and inconclusive findings on diagnostic imaging of breast 09/25/2015    Abnormal mammogram of right breast    Other conditions influencing health status 09/29/2015    Type 2 diabetes mellitus, uncontrolled    Other specified disorders of teeth and supporting structures 11/09/2016    Pain, dental    Personal history of other endocrine, nutritional and metabolic disease 10/07/2015    History of type 2 diabetes mellitus    Personal history of other endocrine, nutritional and metabolic disease 09/29/2015    History of diabetic neuropathy    Personal history of other endocrine, nutritional and metabolic disease 10/07/2015    History of hypercholesterolemia    Vitamin D deficiency, unspecified 10/07/2015    Vitamin D deficiency       Past Surgical History:   Procedure Laterality Date    TUBAL LIGATION  09/29/2015    Tubal Ligation       Family History   Problem Relation Name Age of Onset    Hypertension Mother      Diabetes Mother      Other (Blindness) Mother          left eye        Allergies   Allergen Reactions    Lisinopril Cough        Social history: denies tobacco, etoh, and illicits     Physical Exam   /88   Pulse 88   Ht 1.613 m (5' 3.5\")   Wt 60.1 kg (132 lb 7.9 oz)   BMI 23.10 kg/m²    General: Well appearing, no acute distress  Head and Neck: NCAT  Neuro: alert and oriented  Heart: Normal rate and rhythm  Lungs: CTAB no RWR  Extremities: Warm, no edema  Skin: No rashes    Labs and Imaging     Lab Results   Component Value Date    HGBA1C 15 (A) 01/28/2025    HGBA1C 13.2 (A) 08/08/2022    HGBA1C 12.0 11/23/2020     (L) 01/29/2025    K 4.7 01/29/2025    CL 97 (L) " 01/29/2025    CO2 19 (L) 01/29/2025    BUN 20 01/29/2025    CREATININE 0.75 01/29/2025    CALCIUM 9.7 01/29/2025    ALBUMIN 4.3 01/29/2025    PROT 7.2 01/29/2025    BILITOT 0.4 01/29/2025    ALKPHOS 88 01/29/2025    ALT 11 01/29/2025    AST 12 01/29/2025    GLUCOSE 434 (H) 01/29/2025    CHOL 389 (H) 01/29/2025    TRIG 100 01/29/2025    HDL 81 01/29/2025       Assessment and Plan   Milagros Forde is a 50 y.o. year old female with medical history of T2DM, HTN, subclinical hypothyroidism, here for T2DM management and subclinical hypothyroidism.     Significantly improved T2DM with HbA1c improving from 15 -> 8.1%. Congratulated the patient on her work. Will need to work anti-HLD regimen; plan to repeat lipid panel, unclear if she is taking rosuvastatin or not. She is having nausea so will wait one more month prior to intensifying GLP-1RA. When we escalate GLP-1RA therapy will plan to reduce insulin.     #T2DM  - Dx ~2000   - Complications include: neuropathy, nephropathy, retinopathy, HLD  - Most recent A1c is 15 (1/2025). Goal a1c is <7  - Health maintenance:  ==> According to the ADA, BP goal is <130/80. Patient is above goal, working with PCP.  ==> Most recent retinal exam showed retinopathy. Due 4/2025, appt scheduled  ==> Most recent LDL is 285. Due for repeat in soon to assess statin efficacy. Goal LDL <70.  ==> Most recent urine alb/Cr ratio is 229. Due for repeat soon to assess ARB therapy.  ==> Foot exam completed by podiatry given known hx of neuropathy. Appt scheduled for 4/28/2025  ==> TSH (see below)  - Medication management:  ==> continue lantus 13 units qAM  ==> continue prandial insulin 5u lispro  ==> continue correction insulin 1:50>150 TIDAC  ==> continue Trulicity 1.5 mg weekly (plan to increase to 3 mg in one month)  ==> continue jardiance 10 mg daily    #Hypothyroidism  - Etiology: post-ablation (toxic nodule)  - Currently on 25 mcg LT4  - Continue 25 mcg daily      RTC: OLGA Reyna,  MD   of Medicine  Department of Internal Medicine  [facility]  [facility]

## 2025-04-24 ENCOUNTER — NUTRITION (OUTPATIENT)
Age: 50
End: 2025-04-24
Payer: COMMERCIAL

## 2025-04-24 ENCOUNTER — OFFICE VISIT (OUTPATIENT)
Age: 50
End: 2025-04-24
Payer: COMMERCIAL

## 2025-04-24 VITALS
HEART RATE: 88 BPM | WEIGHT: 132.5 LBS | HEIGHT: 64 IN | SYSTOLIC BLOOD PRESSURE: 130 MMHG | DIASTOLIC BLOOD PRESSURE: 88 MMHG | BODY MASS INDEX: 22.62 KG/M2

## 2025-04-24 DIAGNOSIS — E11.9 TYPE 2 DIABETES MELLITUS WITHOUT COMPLICATION, UNSPECIFIED WHETHER LONG TERM INSULIN USE: ICD-10-CM

## 2025-04-24 DIAGNOSIS — E11.65 UNCONTROLLED TYPE 2 DIABETES MELLITUS WITH HYPERGLYCEMIA, WITH LONG-TERM CURRENT USE OF INSULIN: Primary | ICD-10-CM

## 2025-04-24 DIAGNOSIS — Z79.4 UNCONTROLLED TYPE 2 DIABETES MELLITUS WITH HYPERGLYCEMIA, WITH LONG-TERM CURRENT USE OF INSULIN: Primary | ICD-10-CM

## 2025-04-24 LAB — POC HEMOGLOBIN A1C: 8.1 % (ref 4.2–6.5)

## 2025-04-24 PROCEDURE — 3075F SYST BP GE 130 - 139MM HG: CPT | Performed by: STUDENT IN AN ORGANIZED HEALTH CARE EDUCATION/TRAINING PROGRAM

## 2025-04-24 PROCEDURE — 3079F DIAST BP 80-89 MM HG: CPT | Performed by: STUDENT IN AN ORGANIZED HEALTH CARE EDUCATION/TRAINING PROGRAM

## 2025-04-24 PROCEDURE — 99211 OFF/OP EST MAY X REQ PHY/QHP: CPT | Performed by: STUDENT IN AN ORGANIZED HEALTH CARE EDUCATION/TRAINING PROGRAM

## 2025-04-24 PROCEDURE — 3052F HG A1C>EQUAL 8.0%<EQUAL 9.0%: CPT | Performed by: STUDENT IN AN ORGANIZED HEALTH CARE EDUCATION/TRAINING PROGRAM

## 2025-04-24 PROCEDURE — 99214 OFFICE O/P EST MOD 30 MIN: CPT | Performed by: STUDENT IN AN ORGANIZED HEALTH CARE EDUCATION/TRAINING PROGRAM

## 2025-04-24 PROCEDURE — 83036 HEMOGLOBIN GLYCOSYLATED A1C: CPT | Performed by: STUDENT IN AN ORGANIZED HEALTH CARE EDUCATION/TRAINING PROGRAM

## 2025-04-24 PROCEDURE — 4010F ACE/ARB THERAPY RXD/TAKEN: CPT | Performed by: STUDENT IN AN ORGANIZED HEALTH CARE EDUCATION/TRAINING PROGRAM

## 2025-04-24 PROCEDURE — 3008F BODY MASS INDEX DOCD: CPT | Performed by: STUDENT IN AN ORGANIZED HEALTH CARE EDUCATION/TRAINING PROGRAM

## 2025-04-24 ASSESSMENT — ENCOUNTER SYMPTOMS
LOSS OF SENSATION IN FEET: 1
DEPRESSION: 1
OCCASIONAL FEELINGS OF UNSTEADINESS: 1

## 2025-04-24 NOTE — PROGRESS NOTES
Reason for Visit:  Milagros Forde is a 50 y.o. female who presents for Follow-up DSME Visit  #2    Counseling and Education:     DSME - Meal Planning and Diet Recall  Are you currently following any meal plan: Carbohydrate Counting and Low Carbohydrates.    Does your culture or Congregational require any of the following:  none  Who does the grocery shopping? patient  Who does the cooking in the home? patient    How often do you eat out? Rarely now  How many meals do you eat in per day: three.  Which meals do you tend to skip: none  What do you drink with and between meals: water and unsweetened flavor water.    Diet Recall:   Breakfast: recently just eggs.  Trying to eat no carb's for breakfast  Lunch: sometimes left over dinner with meat and veggies  Dinner:  usually a meat, potato and veggie  States eggs are just to expensive and asking what else can she eat as a protein for breakfast.       DSME - Goals and Recommendations:    DSME S.M.A.R.T. From Previous Visit:  I will keep track of my progress daily by  following by BGM pattern .     Approximate time for goal completion: 4 weeks    Did the patient successfully meet the S.M.A.R.T. Goal: Met.    Comments: Patient has been working on her BGM. Testing every day- bid.   Average Glucose: 137 mg/dL   Time In Range  mg/dl: 85 %  Time Below Range <70mg/dl: 0 %   Time Below Range <54mg/dl: 0 %  Time Above Range >180mg/dl: 15 %  Time Above Range >250mg/dl: 0 %  Glucose Variability: 27 %    Minimum of 72 hours of data were reviewed.     Treatment adjustments/recommendations based on this CGM review and interpretation:  Not testing tid for all meals, and not always using SSI with 1-2 meals per day. Usually missing either lunch or dinner BGM and SSI.  Generally lunch and dinner BG does not need SSI when she is testing.      Current diabetes medication: to start today:  lantus 18 units    prandial insulin 5u lispro, plus correction insulin 1:50>150 TIDAC   INCREASE  Trulicity to 3 mg weekly   INCREASE empagliflozin 10 -> 25 mg daily    Impression:  DSME Topics Covered During Visit:   A1c Review  Reducing Your Risk For Other [place] Concerns  Reviewed Chronic Complications/Risks Related to Diabetes  Review Glycemic Goals (CGM or SMBG)  Ways to Deal With Diabetes Symptoms  Glycemic Pattern Management  Healthy Meal Plan  MyPlate Method  Need for carbohydrates of 15-30 grams per meal    Reviewed Diabetes Technology: presented using CGM vs BGM.  Patient is agreeable to trial CGM at this time.     Personal CGM education provided and CGM started today.     CGM type: Libre2 [] Libre3 pluse[x] DexcomG6[] DexcomG7[x] Guardian[] Other:[]  Inserted by: patient in left upper arm  Lot #[id number] exp: 8/31/2025    The following education was provided:    Sensor site selection, rotation and sensor insertion [x]   Taping/securing of the sensor/transmitter, if applicable [x]   Connection of the transmitter to android phone[x]   Difference between interstitial glucose readings and blood glucose readings [x]   Understanding CGM data and trends [x]   Possible interference of products such as vitamin C [x]   Calibration including timing, frequency and importance of accurate meter/fingerstick technique,  if applicable [x]   Education to prevent overcorrection of high glucose [x]   Treatment of hypoglycemia [x]   Setting and managing alerts/alarms [x]  High Alert: 180  Low Alert: 70   How to use trend arrows to adjust treatment decisions [x]   Problem solving for site adhesiveness and skin reactions [x]   Support with coping and problem solving[x]    Customer support phone number [x]    Data Sharing:   Synced to clinic... [facility] [] T:Connect [] LibreView[x] Clarity[] Tidepool [] CareLink[] Other[]     DSME Topics for Follow-Up:   Review Glycemic Goals (CGM or SMBG)  Ways to Deal With Diabetes Symptoms  Glycemic Pattern Management  Sick Day Rules  Diabetes / Medication Alert Identification  Diet plan-  spacing of carbohyrdates    Materials provided during today's visit:   Teja 3 plus Get Started with Freestyle Teja 3 Plus sensor 2024    Provider Impression: Patient now taking her diabetic plan seriously.   Congratulated patient on her overall improvement on lifestyle changes.  A1c dropped from 15 -->8.1 today in 2 months. BGM target is now within range at 85% TIA.  Patient started her behavioral changes over the last 6 weeks.  She is following up with podiatry and ophthalmology to address any issues that need to be address.   She is planning to see ophthalmology next week due issues with retinopathy and fearful of blindness.  Mother is blind in left eye from complications of diabetes. Admits she is feeling better with more energy and brighter outlook on life in general.   She agreed to contact me or Dr. Reyna  within 4 weeks, if she does decide she likes using cgm for orders to be sent to her pharmacy.       Time: I personally spent a total of 60 minutes with the patient providing diabetes self-management education. Visit documentation will be sent electronically to referring provider.     Provider in office today: MD Nnai Morocho RN, BSN, [facility]

## 2025-04-24 NOTE — PATIENT INSTRUCTIONS
After Visit Summary  It was great seeing you today!    In summary from our visit today, I would like to remind you of the following:    - CONGRATULATIONS on your excellent blood sugar control!!!!  - keep taking 13 units lantus every morning  - keep taking 5 units of lispro with meals  - keep using your 1 unit for every 50 points correction scale as needed prior to meals  - continue 1.5 mg of trulicity weekly. We will increase this dose after your next four injections. Prior to your last injection in your new box, reach out to me for a new prescription. At this time we will also decrease insulin      Division of Endocrinology   Follow-up appointments:  Please arrive at least 20 minutes prior to your follow up appointments in order to keep visits as close as possible to the scheduled times. If you need to cancel an appointment, please call at least 24 hours in advance.    Please bring your medications or an updated list of medications to each of your visits to help ensure safety in prescribing future medications.    If you are being seen for diabetes, please bring your glucose meter and/or glucose log with 2 weeks of glucose readings to each visit.    Medication Refills: Please request medication refills at the time of your appointment.   - Refills requested by phone or [employer] in between visits require at least 3 business days to be processed.    Lab Results: Please allow at least 7 business days for lab results to be reviewed.  - Please note, that some specialty testing may take up to at least 7 business to be completed.   - If there are any urgent issues requiring immediate attention, we will contact you at your provided phone numbers.   - Any non-urgent results will be relayed via [employer] if you have signed up for this service or via a letter through the mail and/or phone call.     Communication with your provider:  - [facility] [employer] is highly recommended as the most efficient means to contact your  provider. However for urgent concerns please call.   - For phone calls, please call our office Monday- Friday 8am to 4:30pm and your message will be relayed to your provider. Please allows 1-2 business days for a response.  - After hours messages are left with an answering service and will be handled by the clinic the following business day.      Sincerely,   Elis Reyna MD  Division of Endocrinology  [facility]

## 2025-04-28 ENCOUNTER — APPOINTMENT (OUTPATIENT)
Dept: PODIATRY | Facility: CLINIC | Age: 50
End: 2025-04-28
Payer: COMMERCIAL

## 2025-04-28 DIAGNOSIS — E11.9 ENCOUNTER FOR DIABETIC FOOT EXAM (MULTI): Primary | ICD-10-CM

## 2025-04-28 DIAGNOSIS — E11.49 OTHER DIABETIC NEUROLOGICAL COMPLICATION ASSOCIATED WITH TYPE 2 DIABETES MELLITUS: ICD-10-CM

## 2025-04-28 PROCEDURE — 3052F HG A1C>EQUAL 8.0%<EQUAL 9.0%: CPT | Performed by: PODIATRIST

## 2025-04-28 PROCEDURE — 99202 OFFICE O/P NEW SF 15 MIN: CPT | Performed by: PODIATRIST

## 2025-04-28 PROCEDURE — 4010F ACE/ARB THERAPY RXD/TAKEN: CPT | Performed by: PODIATRIST

## 2025-04-28 PROCEDURE — 1036F TOBACCO NON-USER: CPT | Performed by: PODIATRIST

## 2025-04-28 NOTE — PROGRESS NOTES
History of Present Illness:   Patient states they are here for Dm exam  Admits to NTB to feet, is on 300mg flo at night  States she still has symptoms that are bothersome.   Most recent A1C is 8.1 April 2025    Past Medical History  Medical History[1]    Medications and Allergies have been reviewed.    Review Of Systems:  GENERAL: No weight loss, malaise or fevers.  HEENT: Negative for frequent or significant headaches,   RESPIRATORY: Negative for cough, wheezing or shortness of breath.  CARDIOVASCULAR: Negative for chest pain, leg swelling or palpitations.    Examination of Both Lower Extremities:   Objective:   Vasc: DP and PT pulses are palpable bilateral.  CFT is less than 3 seconds bilateral.  Skin temperature is warm to cool proximal to distal bilateral.      Neuro: Vibratory, light touch and proprioception are intact bilateral.      Derm: Nails 1-5 bilateral are intact.  Skin is supple with normal texture and turgor noted.  Webspaces are clean, dry and intact bilateral.  There are no hyperkeratoses, ulcerations, verruca or other lesions noted.      Ortho: Muscle strength is 5/5 for all pedal groups tested.   1. Encounter for diabetic foot exam (Multi)        2. Other diabetic neurological complication associated with type 2 diabetes mellitus          Patient exam and eval  Debrided hpk nails  Discussed neuropathy , is on flo, Seeing pcp tomorrow  Recommend requesting an increase in flo to see if that will help with symptoms.   Fu yearly   Sooner if any new prob arise  Patient was in agreement to this plan. All questions answered.      Marva Eli DPM  [phone]  Option 2  Fax: [phone]         [1]   Past Medical History:  Diagnosis Date    Encounter for gynecological examination (general) (routine) without abnormal findings 09/17/2015    Well woman exam with routine gynecological exam    Encounter for other screening for malignant neoplasm of breast 09/17/2015    Breast cancer screening     Encounter for screening for infections with a predominantly sexual mode of transmission 09/17/2015    Screening for STD (sexually transmitted disease)    Encounter for screening for malignant neoplasm of cervix 09/17/2015    Cervical cancer screening    Hypothyroidism due to medicaments and other exogenous substances 10/07/2015    Hypothyroidism, iatrogenic    Other abnormal and inconclusive findings on diagnostic imaging of breast 09/25/2015    Abnormal mammogram of right breast    Other conditions influencing health status 09/29/2015    Type 2 diabetes mellitus, uncontrolled    Other specified disorders of teeth and supporting structures 11/09/2016    Pain, dental    Personal history of other endocrine, nutritional and metabolic disease 10/07/2015    History of type 2 diabetes mellitus    Personal history of other endocrine, nutritional and metabolic disease 09/29/2015    History of diabetic neuropathy    Personal history of other endocrine, nutritional and metabolic disease 10/07/2015    History of hypercholesterolemia    Vitamin D deficiency, unspecified 10/07/2015    Vitamin D deficiency

## 2025-04-29 ENCOUNTER — OFFICE VISIT (OUTPATIENT)
Dept: PRIMARY CARE | Facility: CLINIC | Age: 50
End: 2025-04-29
Payer: COMMERCIAL

## 2025-04-29 VITALS
BODY MASS INDEX: 22.67 KG/M2 | SYSTOLIC BLOOD PRESSURE: 88 MMHG | WEIGHT: 130 LBS | HEART RATE: 89 BPM | DIASTOLIC BLOOD PRESSURE: 60 MMHG | OXYGEN SATURATION: 99 %

## 2025-04-29 DIAGNOSIS — R05.2 SUBACUTE COUGH: ICD-10-CM

## 2025-04-29 DIAGNOSIS — E11.42 DIABETIC PERIPHERAL NEUROPATHY (MULTI): ICD-10-CM

## 2025-04-29 DIAGNOSIS — I10 HYPERTENSION, ESSENTIAL: Primary | ICD-10-CM

## 2025-04-29 DIAGNOSIS — R42 DIZZINESS: ICD-10-CM

## 2025-04-29 PROCEDURE — 3078F DIAST BP <80 MM HG: CPT | Performed by: INTERNAL MEDICINE

## 2025-04-29 PROCEDURE — 99214 OFFICE O/P EST MOD 30 MIN: CPT | Performed by: INTERNAL MEDICINE

## 2025-04-29 PROCEDURE — 3052F HG A1C>EQUAL 8.0%<EQUAL 9.0%: CPT | Performed by: INTERNAL MEDICINE

## 2025-04-29 PROCEDURE — 1036F TOBACCO NON-USER: CPT | Performed by: INTERNAL MEDICINE

## 2025-04-29 PROCEDURE — 3074F SYST BP LT 130 MM HG: CPT | Performed by: INTERNAL MEDICINE

## 2025-04-29 RX ORDER — GABAPENTIN 300 MG/1
300 CAPSULE ORAL 2 TIMES DAILY
Qty: 60 CAPSULE | Refills: 5 | Status: SHIPPED | OUTPATIENT
Start: 2025-04-29 | End: 2025-10-26

## 2025-04-29 RX ORDER — AMLODIPINE BESYLATE 2.5 MG/1
2.5 TABLET ORAL DAILY
Qty: 30 TABLET | Refills: 5 | Status: SHIPPED | OUTPATIENT
Start: 2025-04-29 | End: 2025-10-26

## 2025-04-29 RX ORDER — AMLODIPINE BESYLATE 5 MG/1
5 TABLET ORAL DAILY
Qty: 30 TABLET | Refills: 5 | Status: SHIPPED | OUTPATIENT
Start: 2025-04-29 | End: 2025-04-29 | Stop reason: ENTERED-IN-ERROR

## 2025-04-29 ASSESSMENT — ENCOUNTER SYMPTOMS
HEADACHES: 0
NAUSEA: 0
FEVER: 0
FATIGUE: 0
DIZZINESS: 1
SHORTNESS OF BREATH: 0
ACTIVITY CHANGE: 0
UNEXPECTED WEIGHT CHANGE: 0
APPETITE CHANGE: 0

## 2025-04-29 ASSESSMENT — PAIN SCALES - GENERAL: PAINLEVEL_OUTOF10: 0-NO PAIN

## 2025-04-29 NOTE — PROGRESS NOTES
Subjective   Patient ID: Milagros Forde is a 50 y.o. female who presents for Dizziness.    This is a 50 y.o. presenting for follow up HTN. Pt noticed that she had not been taking Olmesartan and her dizziness improved. She restarted Olmesartan last night and took another dose this morning. Her BP is now low. She is also c/o cough. She noticed it while taking Lisinopril in the past. It improved when she stopped it, but returned with Losartan.  Pt also has DM neuropathy and doesn't feel Gabapentin is working well. She had an exam by Podiatry. She is requesting referral to physical therapy to assist with her pain.           Review of Systems   Constitutional:  Negative for activity change, appetite change, fatigue, fever and unexpected weight change.   Respiratory:  Negative for shortness of breath.    Cardiovascular:  Negative for chest pain.   Gastrointestinal:  Negative for nausea.   Neurological:  Positive for dizziness. Negative for headaches.       Objective   BP 88/60   Pulse 89   Wt 59 kg (130 lb)   SpO2 99%   BMI 22.67 kg/m²     Physical Exam  Vitals reviewed.   Constitutional:       General: She is not in acute distress.     Appearance: Normal appearance. She is not ill-appearing.   Cardiovascular:      Rate and Rhythm: Normal rate and regular rhythm.      Heart sounds: Normal heart sounds.   Pulmonary:      Effort: Pulmonary effort is normal.      Breath sounds: Normal breath sounds.   Neurological:      General: No focal deficit present.      Mental Status: She is alert and oriented to person, place, and time.   Psychiatric:         Mood and Affect: Mood normal.         Assessment/Plan   Diagnoses and all orders for this visit:  Hypertension, essential  -     amLODIPine (Norvasc) 2.5 mg tablet; Take 1 tablet (2.5 mg) by mouth once daily.  Diabetic peripheral neuropathy (Multi)  -     gabapentin (Neurontin) 300 mg capsule; Take 1 capsule (300 mg) by mouth 2 times a day.  -     Referral to Physical  Therapy; Future  Dizziness  Comments:  If dizziness improves after stopping Olmesartan, no other work up is needed. If persists, will refer to NEURO.  Subacute cough  Comments:  May be due to ARB. Will continue to monitor.  Other orders  -     Follow Up In Primary Care - Established

## 2025-04-30 ENCOUNTER — TELEPHONE (OUTPATIENT)
Age: 50
End: 2025-04-30
Payer: COMMERCIAL

## 2025-04-30 LAB
ALBUMIN/CREAT UR: 138 MG/G CREAT
CHOLEST SERPL-MCNC: 204 MG/DL
CHOLEST/HDLC SERPL: 3 (CALC)
CREAT UR-MCNC: 122 MG/DL (ref 20–275)
HDLC SERPL-MCNC: 68 MG/DL
LDLC SERPL CALC-MCNC: 122 MG/DL (CALC)
MICROALBUMIN UR-MCNC: 16.8 MG/DL
NONHDLC SERPL-MCNC: 136 MG/DL (CALC)
TRIGL SERPL-MCNC: 47 MG/DL

## 2025-04-30 NOTE — LETTER
May 2, 2025     Milagros Forde  [address]  [place] [address]    Ms. Forde,     I received your lab results and have attached them to this letter. Your LDL has improved from 285 to 122 which suggests that your rosuvastatin is working. You will require a higher dose though as your LDL goal is <70. I will send an order of 20 mg of rosuvastatin to your pharmacy. Your urine albumin/creatinine ratio is elevated, which is indicative of nephropathy also known as kidney disease. Because your blood pressure was low recently at your primary care provider's office, I will delay adding a medication for this as it could reduce your blood pressure.     Please call our office if you have additional questions [phone] (option 2). Your follow up appointment is scheduled for 7/24/25 at 8 AM.    Dr. Elis Mock MD   of Medicine  Department of Medicine  [facility]  [facility]

## 2025-04-30 NOTE — TELEPHONE ENCOUNTER
Called patient to discuss labs, but she did not answer. HIPAA compliant VM left.     I asked her to call the office so the following can be conveyed to her.    Her LDL has improved from 285 -> 122 which suggests her rosuvastatin is working but that she needs a higher dose. Her goal is <70. I will send an order to 20 mg rosuvastatin daily  Her urine albumin/creatinine ratio is elevated which is suggestive of nephropathy. Ideally we should start her on ACE-I/ARB therapy (ie lisinopril or losartan) but BP at PCP office was 88/60 yesterday so will hold on adding these agents at this time.     Please let me know if she has questions. Otherwise, I will see her at our 7/2025 appt.           Elis Reyna MD   of Medicine  Department of Medicine  [facility]  [facility]

## 2025-05-02 NOTE — TELEPHONE ENCOUNTER
Attempted to call pt again 5/2. No answer. Left VM message instructing pt to call our office back. Will send letter with results if pt does not return call.

## 2025-05-26 DIAGNOSIS — E11.42 DIABETIC PERIPHERAL NEUROPATHY (MULTI): ICD-10-CM

## 2025-05-27 RX ORDER — GABAPENTIN 300 MG/1
300 CAPSULE ORAL NIGHTLY
Qty: 90 CAPSULE | Refills: 1 | Status: SHIPPED | OUTPATIENT
Start: 2025-05-27 | End: 2025-05-29 | Stop reason: DRUGHIGH

## 2025-05-29 ENCOUNTER — OFFICE VISIT (OUTPATIENT)
Dept: PRIMARY CARE | Facility: CLINIC | Age: 50
End: 2025-05-29
Payer: COMMERCIAL

## 2025-05-29 VITALS
OXYGEN SATURATION: 99 % | HEART RATE: 96 BPM | WEIGHT: 134 LBS | SYSTOLIC BLOOD PRESSURE: 128 MMHG | BODY MASS INDEX: 23.36 KG/M2 | DIASTOLIC BLOOD PRESSURE: 80 MMHG | TEMPERATURE: 97.2 F

## 2025-05-29 DIAGNOSIS — I10 HYPERTENSION, ESSENTIAL: Primary | ICD-10-CM

## 2025-05-29 DIAGNOSIS — K59.00 CONSTIPATION, UNSPECIFIED CONSTIPATION TYPE: ICD-10-CM

## 2025-05-29 DIAGNOSIS — E11.42 DIABETIC PERIPHERAL NEUROPATHY (MULTI): ICD-10-CM

## 2025-05-29 PROCEDURE — 3074F SYST BP LT 130 MM HG: CPT | Performed by: INTERNAL MEDICINE

## 2025-05-29 PROCEDURE — 99213 OFFICE O/P EST LOW 20 MIN: CPT | Performed by: INTERNAL MEDICINE

## 2025-05-29 PROCEDURE — 3052F HG A1C>EQUAL 8.0%<EQUAL 9.0%: CPT | Performed by: INTERNAL MEDICINE

## 2025-05-29 PROCEDURE — 3079F DIAST BP 80-89 MM HG: CPT | Performed by: INTERNAL MEDICINE

## 2025-05-29 RX ORDER — BLOOD-GLUCOSE SENSOR
1 EACH MISCELLANEOUS
Qty: 2 EACH | Refills: 11 | Status: SHIPPED | OUTPATIENT
Start: 2025-05-29

## 2025-05-29 RX ORDER — POLYETHYLENE GLYCOL 3350 17 G/17G
17 POWDER, FOR SOLUTION ORAL DAILY PRN
Qty: 510 G | Refills: 3 | Status: SHIPPED | OUTPATIENT
Start: 2025-05-29 | End: 2025-09-26

## 2025-05-29 RX ORDER — GABAPENTIN 300 MG/1
300 CAPSULE ORAL 2 TIMES DAILY
Qty: 90 CAPSULE | Refills: 1 | Status: SHIPPED | OUTPATIENT
Start: 2025-05-29 | End: 2025-08-27

## 2025-05-29 ASSESSMENT — PAIN SCALES - GENERAL: PAINLEVEL_OUTOF10: 0-NO PAIN

## 2025-05-29 NOTE — PROGRESS NOTES
Subjective   Patient ID: Milagros Forde is a 50 y.o. female who presents for Follow-up.    This is a 50 y.o. presenting for follow up of HTN. Her BP is well controlled and since stopping Olmesartan, her dizziness has improved.   She is c/o constipation and would like treatment.  She also has neuropathy in her feet. Gabapentin is not helping much.          Review of Systems   Constitutional:  Negative for activity change, chills and fever.   Respiratory:  Negative for shortness of breath.    Cardiovascular:  Negative for chest pain.   Gastrointestinal:  Positive for constipation. Negative for blood in stool.   Neurological:  Positive for numbness. Negative for dizziness and headaches.       Objective   /80   Pulse 96   Temp 36.2 °C (97.2 °F)   Wt 60.8 kg (134 lb)   SpO2 99%   BMI 23.36 kg/m²     Physical Exam  Vitals reviewed.   Constitutional:       General: She is not in acute distress.     Appearance: Normal appearance. She is not ill-appearing.   Cardiovascular:      Rate and Rhythm: Normal rate and regular rhythm.      Heart sounds: Normal heart sounds.   Pulmonary:      Effort: Pulmonary effort is normal.      Breath sounds: Normal breath sounds.   Neurological:      General: No focal deficit present.      Mental Status: She is alert and oriented to person, place, and time.   Psychiatric:         Mood and Affect: Mood normal.         Assessment/Plan   Diagnoses and all orders for this visit:  Hypertension, essential  Comments:  Continue current medication  Constipation, unspecified constipation type  -     polyethylene glycol (Glycolax, Miralax) 17 gram/dose powder; Mix 17 g of powder and drink once daily as needed for constipation.  Diabetic peripheral neuropathy (Multi)  -     gabapentin (Neurontin) 300 mg capsule; Take 1 capsule (300 mg) by mouth 2 times a day.  -     blood-glucose sensor (FreeStyle Teja 3 Plus Sensor) device; 1 each every 14 (fourteen) days.

## 2025-05-31 ASSESSMENT — ENCOUNTER SYMPTOMS
HEADACHES: 0
SHORTNESS OF BREATH: 0
CONSTIPATION: 1
ACTIVITY CHANGE: 0
NUMBNESS: 1
BLOOD IN STOOL: 0
DIZZINESS: 0
FEVER: 0
CHILLS: 0

## 2025-06-27 DIAGNOSIS — E78.2 MIXED HYPERLIPIDEMIA: ICD-10-CM

## 2025-06-30 RX ORDER — ROSUVASTATIN CALCIUM 10 MG/1
10 TABLET, COATED ORAL DAILY
Qty: 90 TABLET | Refills: 3 | Status: SHIPPED | OUTPATIENT
Start: 2025-06-30

## 2025-07-24 ENCOUNTER — APPOINTMENT (OUTPATIENT)
Age: 50
End: 2025-07-24
Payer: COMMERCIAL

## 2026-02-06 ENCOUNTER — APPOINTMENT (OUTPATIENT)
Dept: OBSTETRICS AND GYNECOLOGY | Facility: CLINIC | Age: 51
End: 2026-02-06
Payer: COMMERCIAL

## 2026-05-04 ENCOUNTER — APPOINTMENT (OUTPATIENT)
Dept: PODIATRY | Facility: CLINIC | Age: 51
End: 2026-05-04
Payer: COMMERCIAL